# Patient Record
Sex: FEMALE | Race: WHITE | Employment: OTHER | URBAN - METROPOLITAN AREA
[De-identification: names, ages, dates, MRNs, and addresses within clinical notes are randomized per-mention and may not be internally consistent; named-entity substitution may affect disease eponyms.]

---

## 2017-08-01 ENCOUNTER — HOSPITAL ENCOUNTER (OUTPATIENT)
Dept: OTHER | Age: 82
Discharge: HOME | End: 2017-08-31
Attending: FAMILY MEDICINE | Admitting: FAMILY MEDICINE

## 2017-08-22 LAB
ALBUMIN SERPL-MCNC: 3.5 GM/DL (ref 3.4–5)
ALP BLD-CCNC: 97 IU/L (ref 40–128)
ALT SERPL-CCNC: 7 U/L (ref 10–40)
ANION GAP SERPL CALCULATED.3IONS-SCNC: 13 MMOL/L (ref 4–16)
AST SERPL-CCNC: 15 IU/L (ref 15–37)
BASOPHILS ABSOLUTE: 0.1 K/CU MM
BASOPHILS RELATIVE PERCENT: 0.8 % (ref 0–1)
BILIRUB SERPL-MCNC: 0.4 MG/DL (ref 0–1)
BUN BLDV-MCNC: 15 MG/DL (ref 6–23)
CALCIUM SERPL-MCNC: 9.2 MG/DL (ref 8.3–10.6)
CHLORIDE BLD-SCNC: 103 MMOL/L (ref 99–110)
CHOLESTEROL: 137 MG/DL
CO2: 24 MMOL/L (ref 21–32)
CREAT SERPL-MCNC: 0.9 MG/DL (ref 0.6–1.1)
DIFFERENTIAL TYPE: ABNORMAL
EOSINOPHILS ABSOLUTE: 0.3 K/CU MM
EOSINOPHILS RELATIVE PERCENT: 4.2 % (ref 0–3)
GFR AFRICAN AMERICAN: >60 ML/MIN/1.73M2
GFR NON-AFRICAN AMERICAN: 60 ML/MIN/1.73M2
GLUCOSE BLD-MCNC: 85 MG/DL (ref 70–140)
HCT VFR BLD CALC: 34 % (ref 37–47)
HDLC SERPL-MCNC: 56 MG/DL
HEMOGLOBIN: 10.9 GM/DL (ref 12.5–16)
IMMATURE NEUTROPHIL %: 0.3 % (ref 0–0.43)
LDL CHOLESTEROL DIRECT: 63 MG/DL
LYMPHOCYTES ABSOLUTE: 2.2 K/CU MM
LYMPHOCYTES RELATIVE PERCENT: 31.6 % (ref 24–44)
MCH RBC QN AUTO: 28.2 PG (ref 27–31)
MCHC RBC AUTO-ENTMCNC: 32.1 % (ref 32–36)
MCV RBC AUTO: 88.1 FL (ref 78–100)
MONOCYTES ABSOLUTE: 0.6 K/CU MM
MONOCYTES RELATIVE PERCENT: 8.4 % (ref 0–4)
NUCLEATED RBC %: 0 %
PDW BLD-RTO: 15.2 % (ref 11.7–14.9)
PLATELET # BLD: 271 K/CU MM (ref 140–440)
PMV BLD AUTO: 9.9 FL (ref 7.5–11.1)
POTASSIUM SERPL-SCNC: 4.2 MMOL/L (ref 3.5–5.1)
RBC # BLD: 3.86 M/CU MM (ref 4.2–5.4)
SEGMENTED NEUTROPHILS ABSOLUTE COUNT: 3.9 K/CU MM
SEGMENTED NEUTROPHILS RELATIVE PERCENT: 54.7 % (ref 36–66)
SODIUM BLD-SCNC: 140 MMOL/L (ref 135–145)
TOTAL IMMATURE NEUTOROPHIL: 0.02 K/CU MM
TOTAL NUCLEATED RBC: 0 K/CU MM
TOTAL PROTEIN: 6 GM/DL (ref 6.4–8.2)
TRIGL SERPL-MCNC: 113 MG/DL
TSH HIGH SENSITIVITY: 6.84 UIU/ML (ref 0.27–4.2)
WBC # BLD: 7.1 K/CU MM (ref 4–10.5)

## 2017-09-01 ENCOUNTER — HOSPITAL ENCOUNTER (OUTPATIENT)
Dept: OTHER | Age: 82
Discharge: HOME | End: 2017-09-30
Attending: FAMILY MEDICINE | Admitting: FAMILY MEDICINE

## 2017-10-01 ENCOUNTER — HOSPITAL ENCOUNTER (OUTPATIENT)
Dept: OTHER | Age: 82
Discharge: HOME | End: 2017-10-31
Attending: FAMILY MEDICINE | Admitting: FAMILY MEDICINE

## 2017-11-01 ENCOUNTER — HOSPITAL ENCOUNTER (OUTPATIENT)
Dept: OTHER | Age: 82
Discharge: HOME | End: 2017-11-30
Attending: FAMILY MEDICINE | Admitting: FAMILY MEDICINE

## 2017-11-16 PROBLEM — K81.0 ACUTE ACALCULOUS CHOLECYSTITIS: Status: ACTIVE | Noted: 2017-11-16

## 2017-11-18 PROBLEM — R74.8 ELEVATED LIVER ENZYMES: Status: ACTIVE | Noted: 2017-11-18

## 2019-02-01 ENCOUNTER — APPOINTMENT (OUTPATIENT)
Dept: GENERAL RADIOLOGY | Age: 84
End: 2019-02-01
Payer: MEDICARE

## 2019-02-01 ENCOUNTER — HOSPITAL ENCOUNTER (EMERGENCY)
Age: 84
Discharge: HOME | End: 2019-02-01
Attending: EMERGENCY MEDICINE
Payer: MEDICARE

## 2019-02-01 ENCOUNTER — APPOINTMENT (OUTPATIENT)
Dept: CT IMAGING | Age: 84
End: 2019-02-01
Payer: MEDICARE

## 2019-02-01 VITALS
HEART RATE: 52 BPM | HEIGHT: 64 IN | RESPIRATION RATE: 17 BRPM | TEMPERATURE: 97.9 F | SYSTOLIC BLOOD PRESSURE: 154 MMHG | OXYGEN SATURATION: 100 % | BODY MASS INDEX: 23.9 KG/M2 | WEIGHT: 140 LBS | DIASTOLIC BLOOD PRESSURE: 80 MMHG

## 2019-02-01 DIAGNOSIS — R53.83 FATIGUE, UNSPECIFIED TYPE: Primary | ICD-10-CM

## 2019-02-01 LAB
ALBUMIN SERPL-MCNC: 4.3 GM/DL (ref 3.4–5)
ALP BLD-CCNC: 61 IU/L (ref 40–129)
ALT SERPL-CCNC: 6 U/L (ref 10–40)
AMMONIA: 22 UMOL/L (ref 11–51)
ANION GAP SERPL CALCULATED.3IONS-SCNC: 14 MMOL/L (ref 4–16)
AST SERPL-CCNC: 13 IU/L (ref 15–37)
BACTERIA: NEGATIVE /HPF
BASOPHILS ABSOLUTE: 0.1 K/CU MM
BASOPHILS RELATIVE PERCENT: 0.9 % (ref 0–1)
BILIRUB SERPL-MCNC: 0.4 MG/DL (ref 0–1)
BILIRUBIN URINE: NEGATIVE MG/DL
BLOOD, URINE: NEGATIVE
BUN BLDV-MCNC: 19 MG/DL (ref 6–23)
CALCIUM SERPL-MCNC: 9.3 MG/DL (ref 8.3–10.6)
CHLORIDE BLD-SCNC: 105 MMOL/L (ref 99–110)
CLARITY: CLEAR
CO2: 21 MMOL/L (ref 21–32)
COLOR: ABNORMAL
CREAT SERPL-MCNC: 1.1 MG/DL (ref 0.6–1.1)
DIFFERENTIAL TYPE: ABNORMAL
EOSINOPHILS ABSOLUTE: 0.1 K/CU MM
EOSINOPHILS RELATIVE PERCENT: 1.4 % (ref 0–3)
GFR AFRICAN AMERICAN: 57 ML/MIN/1.73M2
GFR NON-AFRICAN AMERICAN: 47 ML/MIN/1.73M2
GLUCOSE BLD-MCNC: 92 MG/DL (ref 70–99)
GLUCOSE, URINE: NEGATIVE MG/DL
HCT VFR BLD CALC: 41.7 % (ref 37–47)
HEMOGLOBIN: 13.1 GM/DL (ref 12.5–16)
HYALINE CASTS: 2 /LPF
IMMATURE NEUTROPHIL %: 0.2 % (ref 0–0.43)
KETONES, URINE: NEGATIVE MG/DL
LACTATE: 1.1 MMOL/L (ref 0.4–2)
LEUKOCYTE ESTERASE, URINE: NEGATIVE
LYMPHOCYTES ABSOLUTE: 2.6 K/CU MM
LYMPHOCYTES RELATIVE PERCENT: 43.4 % (ref 24–44)
MCH RBC QN AUTO: 29.6 PG (ref 27–31)
MCHC RBC AUTO-ENTMCNC: 31.4 % (ref 32–36)
MCV RBC AUTO: 94.3 FL (ref 78–100)
MONOCYTES ABSOLUTE: 0.5 K/CU MM
MONOCYTES RELATIVE PERCENT: 8 % (ref 0–4)
NITRITE URINE, QUANTITATIVE: NEGATIVE
NUCLEATED RBC %: 0 %
PDW BLD-RTO: 14.2 % (ref 11.7–14.9)
PH, URINE: 7 (ref 5–8)
PLATELET # BLD: 251 K/CU MM (ref 140–440)
PMV BLD AUTO: 9.1 FL (ref 7.5–11.1)
POTASSIUM SERPL-SCNC: 4.2 MMOL/L (ref 3.5–5.1)
PROTEIN UA: NEGATIVE MG/DL
RBC # BLD: 4.42 M/CU MM (ref 4.2–5.4)
RBC URINE: <1 /HPF (ref 0–6)
SEGMENTED NEUTROPHILS ABSOLUTE COUNT: 2.7 K/CU MM
SEGMENTED NEUTROPHILS RELATIVE PERCENT: 46.1 % (ref 36–66)
SODIUM BLD-SCNC: 140 MMOL/L (ref 135–145)
SPECIFIC GRAVITY UA: 1.01 (ref 1–1.03)
SQUAMOUS EPITHELIAL: <1 /HPF
T4 FREE: 1.26 NG/DL (ref 0.9–1.8)
TOTAL IMMATURE NEUTOROPHIL: 0.01 K/CU MM
TOTAL NUCLEATED RBC: 0 K/CU MM
TOTAL PROTEIN: 7.4 GM/DL (ref 6.4–8.2)
TRICHOMONAS: ABNORMAL /HPF
TROPONIN T: <0.01 NG/ML
TSH HIGH SENSITIVITY: 11.79 UIU/ML (ref 0.27–4.2)
UROBILINOGEN, URINE: NORMAL MG/DL (ref 0.2–1)
WBC # BLD: 5.9 K/CU MM (ref 4–10.5)
WBC UA: <1 /HPF (ref 0–5)

## 2019-02-01 PROCEDURE — 71045 X-RAY EXAM CHEST 1 VIEW: CPT

## 2019-02-01 PROCEDURE — 83605 ASSAY OF LACTIC ACID: CPT

## 2019-02-01 PROCEDURE — 99285 EMERGENCY DEPT VISIT HI MDM: CPT

## 2019-02-01 PROCEDURE — 87040 BLOOD CULTURE FOR BACTERIA: CPT

## 2019-02-01 PROCEDURE — 84443 ASSAY THYROID STIM HORMONE: CPT

## 2019-02-01 PROCEDURE — 81001 URINALYSIS AUTO W/SCOPE: CPT

## 2019-02-01 PROCEDURE — 85025 COMPLETE CBC W/AUTO DIFF WBC: CPT

## 2019-02-01 PROCEDURE — 84439 ASSAY OF FREE THYROXINE: CPT

## 2019-02-01 PROCEDURE — 36415 COLL VENOUS BLD VENIPUNCTURE: CPT

## 2019-02-01 PROCEDURE — 82140 ASSAY OF AMMONIA: CPT

## 2019-02-01 PROCEDURE — 70450 CT HEAD/BRAIN W/O DYE: CPT

## 2019-02-01 PROCEDURE — 84484 ASSAY OF TROPONIN QUANT: CPT

## 2019-02-01 PROCEDURE — 93005 ELECTROCARDIOGRAM TRACING: CPT | Performed by: EMERGENCY MEDICINE

## 2019-02-01 PROCEDURE — 80053 COMPREHEN METABOLIC PANEL: CPT

## 2019-02-01 RX ORDER — LEVOTHYROXINE SODIUM 0.05 MG/1
50 TABLET ORAL DAILY
COMMUNITY
End: 2020-02-27 | Stop reason: ALTCHOICE

## 2019-02-01 RX ORDER — ACETAMINOPHEN 325 MG/1
650 TABLET ORAL EVERY 6 HOURS PRN
COMMUNITY
End: 2020-02-17

## 2019-02-01 RX ORDER — MELOXICAM 7.5 MG/1
7.5 TABLET ORAL DAILY
COMMUNITY
End: 2020-02-17

## 2019-02-01 RX ORDER — RANITIDINE 150 MG/1
150 TABLET ORAL 2 TIMES DAILY
COMMUNITY
End: 2020-02-17

## 2019-02-02 PROCEDURE — 93010 ELECTROCARDIOGRAM REPORT: CPT | Performed by: INTERNAL MEDICINE

## 2019-02-06 LAB
CULTURE: NORMAL
CULTURE: NORMAL
EKG ATRIAL RATE: 312 BPM
EKG DIAGNOSIS: NORMAL
EKG Q-T INTERVAL: 426 MS
EKG QRS DURATION: 80 MS
EKG QTC CALCULATION (BAZETT): 388 MS
EKG R AXIS: -43 DEGREES
EKG T AXIS: -5 DEGREES
EKG VENTRICULAR RATE: 50 BPM
Lab: NORMAL
Lab: NORMAL
REPORT STATUS: NORMAL
REPORT STATUS: NORMAL
SPECIMEN: NORMAL
SPECIMEN: NORMAL

## 2020-02-17 ENCOUNTER — APPOINTMENT (OUTPATIENT)
Dept: CT IMAGING | Age: 85
End: 2020-02-17
Payer: MEDICARE

## 2020-02-17 ENCOUNTER — APPOINTMENT (OUTPATIENT)
Dept: GENERAL RADIOLOGY | Age: 85
End: 2020-02-17
Payer: MEDICARE

## 2020-02-17 ENCOUNTER — HOSPITAL ENCOUNTER (EMERGENCY)
Age: 85
Discharge: HOME | End: 2020-02-17
Payer: MEDICARE

## 2020-02-17 VITALS
RESPIRATION RATE: 18 BRPM | BODY MASS INDEX: 26.46 KG/M2 | HEART RATE: 86 BPM | OXYGEN SATURATION: 98 % | HEIGHT: 64 IN | TEMPERATURE: 97.8 F | DIASTOLIC BLOOD PRESSURE: 82 MMHG | SYSTOLIC BLOOD PRESSURE: 167 MMHG | WEIGHT: 155 LBS

## 2020-02-17 PROCEDURE — 73501 X-RAY EXAM HIP UNI 1 VIEW: CPT

## 2020-02-17 PROCEDURE — 72125 CT NECK SPINE W/O DYE: CPT

## 2020-02-17 PROCEDURE — 93005 ELECTROCARDIOGRAM TRACING: CPT | Performed by: EMERGENCY MEDICINE

## 2020-02-17 PROCEDURE — 70486 CT MAXILLOFACIAL W/O DYE: CPT

## 2020-02-17 PROCEDURE — 2580000003 HC RX 258: Performed by: PHYSICIAN ASSISTANT

## 2020-02-17 PROCEDURE — 90471 IMMUNIZATION ADMIN: CPT | Performed by: PHYSICIAN ASSISTANT

## 2020-02-17 PROCEDURE — 96374 THER/PROPH/DIAG INJ IV PUSH: CPT

## 2020-02-17 PROCEDURE — 6360000002 HC RX W HCPCS: Performed by: PHYSICIAN ASSISTANT

## 2020-02-17 PROCEDURE — 99284 EMERGENCY DEPT VISIT MOD MDM: CPT

## 2020-02-17 PROCEDURE — 96361 HYDRATE IV INFUSION ADD-ON: CPT

## 2020-02-17 PROCEDURE — 70450 CT HEAD/BRAIN W/O DYE: CPT

## 2020-02-17 PROCEDURE — 90715 TDAP VACCINE 7 YRS/> IM: CPT | Performed by: PHYSICIAN ASSISTANT

## 2020-02-17 PROCEDURE — 96375 TX/PRO/DX INJ NEW DRUG ADDON: CPT

## 2020-02-17 RX ORDER — MORPHINE SULFATE 4 MG/ML
4 INJECTION, SOLUTION INTRAMUSCULAR; INTRAVENOUS EVERY 30 MIN PRN
Status: DISCONTINUED | OUTPATIENT
Start: 2020-02-17 | End: 2020-02-18 | Stop reason: HOSPADM

## 2020-02-17 RX ORDER — MAGNESIUM HYDROXIDE/ALUMINUM HYDROXICE/SIMETHICONE 120; 1200; 1200 MG/30ML; MG/30ML; MG/30ML
30 SUSPENSION ORAL EVERY 4 HOURS PRN
COMMUNITY

## 2020-02-17 RX ORDER — TRAMADOL HYDROCHLORIDE 50 MG/1
25 TABLET, FILM COATED ORAL NIGHTLY
Status: ON HOLD | COMMUNITY
End: 2020-08-27 | Stop reason: HOSPADM

## 2020-02-17 RX ORDER — TRAMADOL HYDROCHLORIDE 50 MG/1
50 TABLET ORAL EVERY 6 HOURS PRN
COMMUNITY
End: 2020-02-27

## 2020-02-17 RX ORDER — ACETAMINOPHEN 500 MG
500 TABLET ORAL EVERY 6 HOURS PRN
Qty: 30 TABLET | Refills: 0 | Status: SHIPPED | OUTPATIENT
Start: 2020-02-17

## 2020-02-17 RX ORDER — SERTRALINE HYDROCHLORIDE 25 MG/1
25 TABLET, FILM COATED ORAL NIGHTLY
COMMUNITY

## 2020-02-17 RX ORDER — 0.9 % SODIUM CHLORIDE 0.9 %
1000 INTRAVENOUS SOLUTION INTRAVENOUS ONCE
Status: COMPLETED | OUTPATIENT
Start: 2020-02-17 | End: 2020-02-17

## 2020-02-17 RX ORDER — ACETAMINOPHEN 325 MG/1
650 TABLET ORAL EVERY 6 HOURS PRN
COMMUNITY
End: 2020-02-27

## 2020-02-17 RX ORDER — ONDANSETRON 2 MG/ML
4 INJECTION INTRAMUSCULAR; INTRAVENOUS EVERY 30 MIN PRN
Status: DISCONTINUED | OUTPATIENT
Start: 2020-02-17 | End: 2020-02-18 | Stop reason: HOSPADM

## 2020-02-17 RX ORDER — NITROGLYCERIN 0.4 MG/1
0.4 TABLET SUBLINGUAL EVERY 5 MIN PRN
Status: ON HOLD | COMMUNITY
End: 2020-08-23

## 2020-02-17 RX ORDER — GABAPENTIN 100 MG/1
100 CAPSULE ORAL 3 TIMES DAILY
COMMUNITY

## 2020-02-17 RX ADMIN — ONDANSETRON 4 MG: 2 INJECTION INTRAMUSCULAR; INTRAVENOUS at 16:55

## 2020-02-17 RX ADMIN — MORPHINE SULFATE 4 MG: 4 INJECTION, SOLUTION INTRAMUSCULAR; INTRAVENOUS at 16:56

## 2020-02-17 RX ADMIN — TETANUS TOXOID, REDUCED DIPHTHERIA TOXOID AND ACELLULAR PERTUSSIS VACCINE, ADSORBED 0.5 ML: 5; 2.5; 8; 8; 2.5 SUSPENSION INTRAMUSCULAR at 16:58

## 2020-02-17 RX ADMIN — SODIUM CHLORIDE 1000 ML: 9 INJECTION, SOLUTION INTRAVENOUS at 16:54

## 2020-02-17 ASSESSMENT — PAIN SCALES - GENERAL
PAINLEVEL_OUTOF10: 10
PAINLEVEL_OUTOF10: 9

## 2020-02-17 NOTE — ED NOTES
Pt back from radiology       [address] [place] [address] Dr. Cullen Estrada, [facility]0 [facility]  02/17/20 4431

## 2020-02-17 NOTE — PROGRESS NOTES
Medication History  [facility]    Patient Name: Austin Garrison 11/28/1932     Medication history has been completed by: Bill Vicente CPhT    Source(s) of information: [facility]    Primary Care Physician: Yuni Tripp DO     Pharmacy: [facility]    Allergies as of 02/17/2020 - Review Complete 02/17/2020   Allergen Reaction Noted    Pcn [penicillins] Other (See Comments) 11/16/2017        Prior to Admission medications    Medication Sig Start Date End Date Taking? Authorizing Provider   sertraline (ZOLOFT) 25 MG tablet Take 25 mg by mouth nightly   Yes Historical Provider, MD   gabapentin (NEURONTIN) 100 MG capsule Take 100 mg by mouth 3 times daily. Yes Historical Provider, MD   acetaminophen (APAP EXTRA STRENGTH) 500 MG tablet Take 1 tablet by mouth every 6 hours as needed for Pain 2/17/20  Yes CHARITY Casas   traMADol (ULTRAM) 25 MG split-tablet Take 25 mg by mouth nightly. Yes Historical Provider, MD   levothyroxine (SYNTHROID) 50 MCG tablet Take 50 mcg by mouth Daily    Yes Historical Provider, MD   polyethylene glycol (GLYCOLAX) packet Take 17 g by mouth daily    Yes Historical Provider, MD   Cholecalciferol (VITAMIN D3) 54084 units CAPS Take 1 capsule by mouth every 30 days    Yes Historical Provider, MD   apixaban (ELIQUIS) 5 MG TABS tablet Take 5 mg by mouth 2 times daily    Yes Historical Provider, MD   traMADol (ULTRAM) 50 MG tablet Take 50 mg by mouth every 6 hours as needed for Pain. Historical Provider, MD   nitroGLYCERIN (NITROSTAT) 0.4 MG SL tablet Place 0.4 mg under the tongue every 5 minutes as needed for Chest pain up to max of 3 total doses. If no relief after 1 dose, call 911.     Historical Provider, MD   aluminum & magnesium hydroxide-simethicone (MAALOX) 200-200-20 MG/5ML SUSP suspension Take 30 mLs by mouth every 4 hours as needed for Indigestion    Historical Provider, MD   acetaminophen (TYLENOL) 325 MG tablet Take 650 mg by mouth every 6 hours as needed for Pain    Historical Provider, MD   Menthol, Topical Analgesic, (BIOFREEZE EX) Apply topically 2 times daily     Historical Provider, MD       Medications added or changed (ex. new medication, dosage change, interval change, formulation change):  Levothyroxine dose clarified as 50 mcg qd as multiple doses on file  Zoloft new medication  Tramadol new scheduled and prn medication  Gabapentin new medication  Nitroglycerin new medication  Maalox new medication  Biofreeze frequency clarified as bid as no sig entered  Eliquis frequency clarified as bid as no sig entered  Glycolax frequency changed to qd scheduled from qd prn  Vitamin D dose clarified as 61508 units monthly as no sig entered    Medications removed from list (include reason, ex. noncompliance, medication cost, therapy complete etc.):   Aspirin no longer taking  Atorvastatin no longer taking  Norco no longer taking  Levothyroxine duplicate therapy   Mobic no longer taking  Omeprazole no longer taking  Zofran no longer taking  Ranitidine no longer taking  Vitamin E no longer taking  Tylenol removed in error    Comments:  Reviewed and updated med list per list sent with patient from facility  Patient stuttering and having trouble finding the words to say during assessment. Alerted RN and ER Pharmacist however after speaking with RN at [address] [place] [address],[address] found that this is typical for this patient so updated all parties.    RN at facility also states patient was at [facility] today for Afib which was diagnosed about 2 weeks ago    To my knowledge the above medication history is accurate as of 2/17/2020 6:09 PM.   Roberto Lewis CPhT   2/17/2020 6:09 PM

## 2020-02-17 NOTE — ED PROVIDER NOTES
eMERGENCY dEPARTMENT eNCOUnter      PCP: Teja Singh DO    CHIEF COMPLAINT    Chief Complaint   Patient presents with    Fall     Lost Balance and Bret Sergio, Right Hip/Leg Pain, Neck Pain, Head Laceration       HPI    Dianne Rosario is a 80 y.o. female who presents with fall. Onset was prior to arrival.  Patient reports that she was trying to get out the car in a parking lot when she fell to the side, hitting the right side of her head and right hip. No reported loss of consciousness. She does have laceration to right side of forehead with some swelling. She has not been ambulatory following the accident. She complains of head, neck and bilateral hip pain. Denies decreased sensation or weakness of extremities. She does have history of CVA with dysphasia. The fall was mechanical in nature without preceding symptoms. REVIEW OF SYSTEMS    General: No fever  ENT:  No visual changes. + headache. Cardiac: No Chest Pain, denies syncope  Respiratory: No cough or difficulty breathing  GI: No vomiting. No Bloody Stool or Diarrhea  : No Dysuria or Hematuria  MSKTL:  See HPI.  + neck, no back pain. Neurologic: denies LOC, + headache, no dizziness, confusion.   No hearing loss    See HPI and nursing notes for additional information     PAST MEDICAL & SURGICAL HISTORY    Past Medical History:   Diagnosis Date    Atrial fibrillation ([address]     Cerebral artery occlusion with cerebral infarction ([address]     CVD (cardiovascular disease)     Dysphagia     GERD (gastroesophageal reflux disease)     Hypertension     Muscle weakness     Thyroid disease     hypo     Past Surgical History:   Procedure Laterality Date    APPENDECTOMY      BACK SURGERY      HIP SURGERY Right     repair    HYSTERECTOMY         CURRENT MEDICATIONS    Current Outpatient Rx   Medication Sig Dispense Refill    sertraline (ZOLOFT) 25 MG tablet Take 25 mg by mouth nightly      gabapentin (NEURONTIN) 100 MG capsule Take 100 mg by mouth 3 times daily.  acetaminophen (APAP EXTRA STRENGTH) 500 MG tablet Take 1 tablet by mouth every 6 hours as needed for Pain 30 tablet 0    traMADol (ULTRAM) 25 MG split-tablet Take 25 mg by mouth nightly.  levothyroxine (SYNTHROID) 50 MCG tablet Take 50 mcg by mouth Daily       polyethylene glycol (GLYCOLAX) packet Take 17 g by mouth daily       Cholecalciferol (VITAMIN D3) 60518 units CAPS Take 1 capsule by mouth every 30 days       apixaban (ELIQUIS) 5 MG TABS tablet Take 5 mg by mouth 2 times daily       traMADol (ULTRAM) 50 MG tablet Take 50 mg by mouth every 6 hours as needed for Pain.  nitroGLYCERIN (NITROSTAT) 0.4 MG SL tablet Place 0.4 mg under the tongue every 5 minutes as needed for Chest pain up to max of 3 total doses. If no relief after 1 dose, call 911.       aluminum & magnesium hydroxide-simethicone (MAALOX) 200-200-20 MG/5ML SUSP suspension Take 30 mLs by mouth every 4 hours as needed for Indigestion      acetaminophen (TYLENOL) 325 MG tablet Take 650 mg by mouth every 6 hours as needed for Pain      Menthol, Topical Analgesic, (BIOFREEZE EX) Apply topically 2 times daily          ALLERGIES    Allergies   Allergen Reactions    Pcn [Penicillins] Other (See Comments)       SOCIAL & FAMILY HISTORY    Social History     Socioeconomic History    Marital status:      Spouse name: None    Number of children: None    Years of education: None    Highest education level: None   Occupational History    None   Social Needs    Financial resource strain: None    Food insecurity:     Worry: None     Inability: None    Transportation needs:     Medical: None     Non-medical: None   Tobacco Use    Smoking status: Never Smoker    Smokeless tobacco: Never Used   Substance and Sexual Activity    Alcohol use: No    Drug use: No    Sexual activity: None   Lifestyle    Physical activity:     Days per week: None     Minutes per session: None    Stress: None

## 2020-02-17 NOTE — ED TRIAGE NOTES
Pt presents to the ED by squad for a fall with resulting right hip/leg pain, laceration to the right side of head with no LOC, and neck pain. Pt placed on C-Collar by EMS. Pt takes Eliquis.

## 2020-02-17 NOTE — ED NOTES
Bed: H-03  Expected date:   Expected time:   Means of arrival:   Comments:  NACHO Sepulveda  02/17/20 1529

## 2020-02-18 ENCOUNTER — HOSPITAL ENCOUNTER (EMERGENCY)
Age: 85
Discharge: HOME | End: 2020-02-19
Payer: MEDICARE

## 2020-02-18 ENCOUNTER — APPOINTMENT (OUTPATIENT)
Dept: GENERAL RADIOLOGY | Age: 85
End: 2020-02-18
Payer: MEDICARE

## 2020-02-18 ENCOUNTER — APPOINTMENT (OUTPATIENT)
Dept: CT IMAGING | Age: 85
End: 2020-02-18
Payer: MEDICARE

## 2020-02-18 ENCOUNTER — APPOINTMENT (OUTPATIENT)
Dept: ULTRASOUND IMAGING | Age: 85
End: 2020-02-18
Payer: MEDICARE

## 2020-02-18 PROCEDURE — 73700 CT LOWER EXTREMITY W/O DYE: CPT

## 2020-02-18 PROCEDURE — 93010 ELECTROCARDIOGRAM REPORT: CPT | Performed by: INTERNAL MEDICINE

## 2020-02-18 PROCEDURE — 99284 EMERGENCY DEPT VISIT MOD MDM: CPT

## 2020-02-18 PROCEDURE — 73560 X-RAY EXAM OF KNEE 1 OR 2: CPT

## 2020-02-18 PROCEDURE — 73590 X-RAY EXAM OF LOWER LEG: CPT

## 2020-02-18 PROCEDURE — 93971 EXTREMITY STUDY: CPT

## 2020-02-18 PROCEDURE — 6370000000 HC RX 637 (ALT 250 FOR IP): Performed by: PHYSICIAN ASSISTANT

## 2020-02-18 RX ORDER — TRAMADOL HYDROCHLORIDE 50 MG/1
50 TABLET ORAL ONCE
Status: COMPLETED | OUTPATIENT
Start: 2020-02-18 | End: 2020-02-18

## 2020-02-18 RX ORDER — ACETAMINOPHEN 325 MG/1
650 TABLET ORAL ONCE
Status: COMPLETED | OUTPATIENT
Start: 2020-02-18 | End: 2020-02-18

## 2020-02-18 RX ORDER — TRAMADOL HYDROCHLORIDE 50 MG/1
50 TABLET ORAL EVERY 6 HOURS PRN
Qty: 10 TABLET | Refills: 0 | Status: SHIPPED | OUTPATIENT
Start: 2020-02-18 | End: 2020-02-21

## 2020-02-18 RX ADMIN — ACETAMINOPHEN 650 MG: 325 TABLET ORAL at 18:26

## 2020-02-18 RX ADMIN — TRAMADOL HYDROCHLORIDE 50 MG: 50 TABLET, FILM COATED ORAL at 20:53

## 2020-02-18 ASSESSMENT — PAIN SCALES - GENERAL
PAINLEVEL_OUTOF10: 4
PAINLEVEL_OUTOF10: 7
PAINLEVEL_OUTOF10: 6
PAINLEVEL_OUTOF10: 3
PAINLEVEL_OUTOF10: 5

## 2020-02-18 ASSESSMENT — PAIN DESCRIPTION - LOCATION: LOCATION: FOOT

## 2020-02-18 ASSESSMENT — PAIN DESCRIPTION - ORIENTATION: ORIENTATION: RIGHT;LEFT

## 2020-02-18 ASSESSMENT — PAIN DESCRIPTION - PAIN TYPE: TYPE: ACUTE PAIN

## 2020-02-18 NOTE — ED NOTES
Called and talked with Marquis Maya ZAPATA at [facility] area; updated [place] LPN that the pt will be leaving here around 2200 with med trans       Maggie White, [facility]  02/17/20 1906

## 2020-02-18 NOTE — CARE COORDINATION
CM consulted to room #15 by Leopold Leek to assist with discharge planning. Patient resident at [facility] assisted living. Emily Rowe concerned with availability of extra assistance for patient when necessary at assisted living. Emily Rowe states patient is having shuffling gate and difficulty ambulating without assistance. CM contacted [facility]. [facility] staff state all patient needs to do is press alert button when she needs to ambulate and assistance will be sent to patient's room.

## 2020-02-18 NOTE — ED PROVIDER NOTES
Triage Chief Complaint:   Foot Pain (post fall 2/17 pain to both legs more r leg)    [place]:  Today in the ED I had the pleasure of caring for Paloma Eller who is a 80 y.o. female that presents today to the emergency department complaining of right sided leg pain. Context this patient fell out of the car while trying to get out of the car yesterday. Was seen here in the ED. Had a CT scan of her face head neck which were negative. Also had x-rays of bilateral hips which were negative. Discharged home. Back to the nursing home. Patient states that she can barely walk secondary to pain the pain is mainly in her right leg she points to the knee and anterior mid tibial/fibular region. She denies any foot pain. She denies any headache. Denies paresthesias    ROS:  REVIEW OF SYSTEMS    At least 10 systems reviewed      All other review of systems are negative  See HPI and nursing notes for additional information       Past Medical History:   Diagnosis Date    Atrial fibrillation ([address]     Cerebral artery occlusion with cerebral infarction ([address]     CVD (cardiovascular disease)     Dysphagia     GERD (gastroesophageal reflux disease)     Hypertension     Muscle weakness     Thyroid disease     hypo     Past Surgical History:   Procedure Laterality Date    APPENDECTOMY      BACK SURGERY      HIP SURGERY Right     repair    HYSTERECTOMY       History reviewed. No pertinent family history.   Social History     Socioeconomic History    Marital status:      Spouse name: Not on file    Number of children: Not on file    Years of education: Not on file    Highest education level: Not on file   Occupational History    Not on file   Social Needs    Financial resource strain: Not on file    Food insecurity:     Worry: Not on file     Inability: Not on file    Transportation needs:     Medical: Not on file     Non-medical: Not on file   Tobacco Use    Smoking status: Never Smoker    Smokeless tobacco: Never Used   Substance and Sexual Activity    Alcohol use: No    Drug use: No    Sexual activity: Not on file   Lifestyle    Physical activity:     Days per week: Not on file     Minutes per session: Not on file    Stress: Not on file   Relationships    Social connections:     Talks on phone: Not on file     Gets together: Not on file     Attends Yarsani service: Not on file     Active member of club or organization: Not on file     Attends meetings of clubs or organizations: Not on file     Relationship status: Not on file    Intimate partner violence:     Fear of current or ex partner: Not on file     Emotionally abused: Not on file     Physically abused: Not on file     Forced sexual activity: Not on file   Other Topics Concern    Not on file   Social History Narrative    Not on file     No current facility-administered medications for this encounter. Current Outpatient Medications   Medication Sig Dispense Refill    sertraline (ZOLOFT) 25 MG tablet Take 25 mg by mouth nightly      traMADol (ULTRAM) 50 MG tablet Take 50 mg by mouth every 6 hours as needed for Pain.  gabapentin (NEURONTIN) 100 MG capsule Take 100 mg by mouth 3 times daily.  nitroGLYCERIN (NITROSTAT) 0.4 MG SL tablet Place 0.4 mg under the tongue every 5 minutes as needed for Chest pain up to max of 3 total doses. If no relief after 1 dose, call 911.  acetaminophen (APAP EXTRA STRENGTH) 500 MG tablet Take 1 tablet by mouth every 6 hours as needed for Pain 30 tablet 0    traMADol (ULTRAM) 25 MG split-tablet Take 25 mg by mouth nightly.       aluminum & magnesium hydroxide-simethicone (MAALOX) 200-200-20 MG/5ML SUSP suspension Take 30 mLs by mouth every 4 hours as needed for Indigestion      acetaminophen (TYLENOL) 325 MG tablet Take 650 mg by mouth every 6 hours as needed for Pain      Menthol, Topical Analgesic, (BIOFREEZE EX) Apply topically 2 times daily       levothyroxine (SYNTHROID) 50 MCG tablet spine. Xr Hip 1 Vw W Pelvis Right    Result Date: 2/17/2020  EXAMINATION: ONE XRAY VIEW OF THE PELVIS AND TWO XRAY VIEWS RIGHT HIP 2/17/2020 4:29 pm COMPARISON: None. HISTORY: ORDERING SYSTEM PROVIDED HISTORY: trauma TECHNOLOGIST PROVIDED HISTORY: Reason for exam:->trauma Reason for Exam: trauma Initial encounter. FINDINGS: Trauma board artifact degrades examination. Remote ORIF of a right femoral neck fracture. Pelvic ring is grossly intact. No acute displaced fracture within limits of this exam.  Prior ORIF of a right femoral neck fracture. MDM:   Neurovascularly intact patient presents today with knee pain. X-ray of the knee shows equivocal results so CT scan was ordered which reveals a possible occult fracture. There is hemarthrosis noted and severe to degenerative changes. With remaining possibility patient does have an occult fracture patient is provided a knee immobilizer analgesia and will require follow-up with orthopedics    I estimate there is LOW risk for (including but not limited to) OPEN FRACTURE, COMPARTMENT SYNDROME, DEEP VENOUS THROMBOSIS, COMPLETETENDON RUPTURE, NECROTIZING FASCIITIS, or ACUTE ARTERIAL INJURY, thus I consider the discharge disposition reasonable. Mare Bret (or their surrogate) and I have discussed the diagnosis and risks, and we agree with discharging home with close follow-up. We also discussed returning to the Emergency Department immediately if new or worsening symptoms occur. We have discussed the symptoms which are most concerning that necessitate immediate return. I independently managed patient today in the ED        BP (!) 138/114   Pulse 65   Temp 97.5 °F (36.4 °C) (Oral)   Resp 16   Ht 5' 4\" (1.626 m)   Wt 155 lb (70.3 kg)   SpO2 97%   BMI 26.61 kg/m²       Clinical Impression:  1. Effusion of right knee    2.  Abnormal CT scan        Disposition referral (if applicable):  Yessi Young MD  [address]

## 2020-02-18 NOTE — ED TRIAGE NOTES
Pt from nursing to ed with ems, c/o pain to ble post fall on 2/17. Pt noticed to have abrasion to r with old drainage. , bruising with slight swelling to l hand. Denies any other ss. VS stable. AOx4.

## 2020-02-19 ENCOUNTER — TELEPHONE (OUTPATIENT)
Dept: CARDIOLOGY CLINIC | Age: 85
End: 2020-02-19

## 2020-02-19 VITALS
BODY MASS INDEX: 26.46 KG/M2 | HEART RATE: 62 BPM | SYSTOLIC BLOOD PRESSURE: 159 MMHG | TEMPERATURE: 97.5 F | HEIGHT: 64 IN | DIASTOLIC BLOOD PRESSURE: 96 MMHG | RESPIRATION RATE: 15 BRPM | WEIGHT: 155 LBS | OXYGEN SATURATION: 99 %

## 2020-02-19 NOTE — ED NOTES
Report received from [facility], care assumed at this time     Demian Stokes, RN  02/18/20 2418

## 2020-02-19 NOTE — TELEPHONE ENCOUNTER
Attempting to reach  in order to schedule a consult for chest pain per referral from [facility]; no answer, no voicemail.

## 2020-02-19 NOTE — ED NOTES
Narrative   EXAMINATION:   TWO XRAY VIEWS OF THE RIGHT KNEE;   XRAY VIEWS OF THE RIGHT TIBIA AND FIBULA       2/18/2020 6:29 pm       COMPARISON:   None.       HISTORY:   ORDERING SYSTEM PROVIDED HISTORY: pain   TECHNOLOGIST PROVIDED HISTORY:   Reason for exam:->pain   Reason for Exam: rt. knee pain   Acuity: Acute   Type of Exam: Initial   Mechanism of Injury: fall   Relevant Medical/Surgical History: na; ORDERING SYSTEM PROVIDED HISTORY: pain   TECHNOLOGIST PROVIDED HISTORY:   Reason for exam:->pain   Reason for Exam: rt. lower leg pain   Acuity: Acute   Type of Exam: Initial   Mechanism of Injury: fall   Relevant Medical/Surgical History: na       FINDINGS:   Images of the knee, tibia, and fibula are reviewed. Mary Tijerina.  Moderate to   severe tricompartmental osteoarthritis of the knee.  Slight cortical   irregularity along the medial femoral condyle, suspicious for an   osteochondral defect.  A moderate knee joint lipohemarthrosis is present.  No   displaced fracture of the tibia or fibula is seen.           Impression   A knee joint lipohemarthrosis is present, which is concerning for an occult   intra-articular fracture such as a tibial plateau fracture.  This could be   further evaluated with a noncontrast CT of the knee.       Suspected osteochondral defect along the medial femoral condyle.         Bao Rodriguez RN  02/18/20 1931

## 2020-02-19 NOTE — ED NOTES
Pt continues to sleep in a position of comfort. No needs identified at this time. Bed in lowest position and call light within reach. Respirations even, no distress noted. Lights turned out for comfort.       Mark Chow RN  02/18/20 2112

## 2020-02-19 NOTE — ED NOTES
Narrative   EXAMINATION:   DUPLEX VENOUS ULTRASOUND OF THE RIGHT LOWER EXTREMITY, 2/18/2020 6:51 pm       TECHNIQUE:   Duplex ultrasound and Doppler images were obtained of the right lower   extremity.       COMPARISON:   None.       HISTORY:   ORDERING SYSTEM PROVIDED HISTORY: pain   TECHNOLOGIST PROVIDED HISTORY:   Reason for exam:->pain   Reason for Exam: pain   Acuity: Acute   Type of Exam: Initial       FINDINGS:   The visualized veins of the right lower extremity are patent and free of   echogenic thrombus. The veins are normally compressible and have normal   phasic flow.           Impression   No evidence of DVT in the right lower extremity.         Ngozi Evans RN  02/18/20 1932

## 2020-02-19 NOTE — ED NOTES
Pt resting in a position of comfort. No needs identified at this time. Bed in lowest position and call light within reach. Respirations even, no distress noted.       Sunday Vides RN  02/18/20 2013

## 2020-02-25 LAB
EKG DIAGNOSIS: NORMAL
EKG Q-T INTERVAL: 410 MS
EKG QRS DURATION: 86 MS
EKG QTC CALCULATION (BAZETT): 512 MS
EKG R AXIS: -39 DEGREES
EKG T AXIS: 10 DEGREES
EKG VENTRICULAR RATE: 94 BPM

## 2020-02-27 ENCOUNTER — OFFICE VISIT (OUTPATIENT)
Dept: ORTHOPEDIC SURGERY | Age: 85
End: 2020-02-27
Payer: MEDICARE

## 2020-02-27 VITALS — BODY MASS INDEX: 25.61 KG/M2 | HEIGHT: 64 IN | WEIGHT: 150 LBS | RESPIRATION RATE: 16 BRPM

## 2020-02-27 PROCEDURE — G8484 FLU IMMUNIZE NO ADMIN: HCPCS | Performed by: ORTHOPAEDIC SURGERY

## 2020-02-27 PROCEDURE — 1090F PRES/ABSN URINE INCON ASSESS: CPT | Performed by: ORTHOPAEDIC SURGERY

## 2020-02-27 PROCEDURE — 1123F ACP DISCUSS/DSCN MKR DOCD: CPT | Performed by: ORTHOPAEDIC SURGERY

## 2020-02-27 PROCEDURE — 99203 OFFICE O/P NEW LOW 30 MIN: CPT | Performed by: ORTHOPAEDIC SURGERY

## 2020-02-27 PROCEDURE — 4040F PNEUMOC VAC/ADMIN/RCVD: CPT | Performed by: ORTHOPAEDIC SURGERY

## 2020-02-27 PROCEDURE — G8427 DOCREV CUR MEDS BY ELIG CLIN: HCPCS | Performed by: ORTHOPAEDIC SURGERY

## 2020-02-27 PROCEDURE — 1036F TOBACCO NON-USER: CPT | Performed by: ORTHOPAEDIC SURGERY

## 2020-02-27 PROCEDURE — G8417 CALC BMI ABV UP PARAM F/U: HCPCS | Performed by: ORTHOPAEDIC SURGERY

## 2020-02-27 RX ORDER — LEVOTHYROXINE SODIUM 0.05 MG/1
100 TABLET ORAL DAILY
Status: ON HOLD | COMMUNITY
Start: 2020-01-16 | End: 2020-08-27 | Stop reason: HOSPADM

## 2020-02-27 RX ORDER — HYDROCODONE BITARTRATE AND ACETAMINOPHEN 5; 325 MG/1; MG/1
TABLET ORAL
Status: ON HOLD | COMMUNITY
Start: 2020-02-20 | End: 2020-08-27 | Stop reason: HOSPADM

## 2020-02-27 ASSESSMENT — ENCOUNTER SYMPTOMS
EYE PAIN: 0
COLOR CHANGE: 0
VOMITING: 0
SHORTNESS OF BREATH: 0
EYE REDNESS: 0
CHEST TIGHTNESS: 0
WHEEZING: 0

## 2020-02-27 NOTE — PROGRESS NOTES
occlusion with cerebral infarction ([facility]     CVD (cardiovascular disease)     Dysphagia     GERD (gastroesophageal reflux disease)     Hypertension     Muscle weakness     Thyroid disease     hypo     No family history on file. Social History     Socioeconomic History    Marital status:      Spouse name: None    Number of children: None    Years of education: None    Highest education level: None   Occupational History    None   Social Needs    Financial resource strain: None    Food insecurity:     Worry: None     Inability: None    Transportation needs:     Medical: None     Non-medical: None   Tobacco Use    Smoking status: Never Smoker    Smokeless tobacco: Never Used   Substance and Sexual Activity    Alcohol use: No    Drug use: No    Sexual activity: None   Lifestyle    Physical activity:     Days per week: None     Minutes per session: None    Stress: None   Relationships    Social connections:     Talks on phone: None     Gets together: None     Attends Latter day service: None     Active member of club or organization: None     Attends meetings of clubs or organizations: None     Relationship status: None    Intimate partner violence:     Fear of current or ex partner: None     Emotionally abused: None     Physically abused: None     Forced sexual activity: None   Other Topics Concern    None   Social History Narrative    None     Current Outpatient Medications   Medication Sig Dispense Refill    HYDROcodone-acetaminophen (NORCO) 5-325 MG per tablet       levothyroxine (SYNTHROID) 50 MCG tablet 50 mcg       sertraline (ZOLOFT) 25 MG tablet Take 25 mg by mouth nightly      gabapentin (NEURONTIN) 100 MG capsule Take 100 mg by mouth 3 times daily.  nitroGLYCERIN (NITROSTAT) 0.4 MG SL tablet Place 0.4 mg under the tongue every 5 minutes as needed for Chest pain up to max of 3 total doses. If no relief after 1 dose, call 911.       acetaminophen (APAP EXTRA STRENGTH) 500 MG tablet Take 1 tablet by mouth every 6 hours as needed for Pain 30 tablet 0    aluminum & magnesium hydroxide-simethicone (MAALOX) 200-200-20 MG/5ML SUSP suspension Take 30 mLs by mouth every 4 hours as needed for Indigestion      Menthol, Topical Analgesic, (BIOFREEZE EX) Apply topically 2 times daily       polyethylene glycol (GLYCOLAX) packet Take 17 g by mouth daily       Cholecalciferol (VITAMIN D3) 65732 units CAPS Take 1 capsule by mouth every 30 days       apixaban (ELIQUIS) 5 MG TABS tablet Take 5 mg by mouth 2 times daily       traMADol (ULTRAM) 25 MG split-tablet Take 25 mg by mouth nightly. No current facility-administered medications for this visit. Allergies   Allergen Reactions    Pcn [Penicillins] Other (See Comments)       Review of Systems:   Review of Systems   Constitutional: Negative for chills and fever. HENT: Negative for congestion and sneezing. Eyes: Negative for pain and redness. Respiratory: Negative for chest tightness, shortness of breath and wheezing. Cardiovascular: Negative for chest pain and palpitations. Gastrointestinal: Negative for vomiting. Musculoskeletal: Positive for arthralgias and gait problem. Skin: Negative for color change and rash. Psychiatric/Behavioral: Negative for agitation. The patient is not nervous/anxious. Examination:  General Exam:  Vitals: Resp 16   Ht 5' 4\" (1.626 m)   Wt 150 lb (68 kg)   BMI 25.75 kg/m²    Physical Exam  Vitals signs and nursing note reviewed. Constitutional:       Appearance: Normal appearance. HENT:      Head: Normocephalic and atraumatic. Eyes:      Conjunctiva/sclera: Conjunctivae normal.      Pupils: Pupils are equal, round, and reactive to light. Neck:      Musculoskeletal: Normal range of motion.    Pulmonary:      Effort: Pulmonary effort is normal.   Musculoskeletal:      Right hip: She exhibits normal range of motion, normal strength, no

## 2020-02-27 NOTE — PROGRESS NOTES
Patient here for a new patient ED f/u for right knee pain due to a fall on 2/17/2020. [facility]. She was provided with a knee immobilizer and is take norco per med list brought in today with patient per SNF. She is a poor historian and is somewhat tearful and does not want her leg touched. She states her pain level is \"100/10\". ED XR RIGHT KNEE 2/18/2020  Impression   A knee joint lipohemarthrosis is present, which is concerning for an occult   intra-articular fracture such as a tibial plateau fracture.  This could be   further evaluated with a noncontrast CT of the knee.       Suspected osteochondral defect along the medial femoral condyle.               CT RIGHT KNEE 2/18/2020     Impression   1. Large hemarthrosis.  No definite fracture identified, however, the bones   are severely osteopenic and evaluation for nondisplaced fracture is limited. Hemarthrosis may be related to radiographically occult fracture versus   internal derangement of the knee.  MRI of the right knee could be obtained   for further evaluation. 2. Moderate to severe tricompartmental osteoarthritis. 3. Chondrocalcinosis.

## 2020-08-02 ENCOUNTER — APPOINTMENT (OUTPATIENT)
Dept: GENERAL RADIOLOGY | Age: 85
DRG: 083 | End: 2020-08-02
Payer: MEDICARE

## 2020-08-02 ENCOUNTER — HOSPITAL ENCOUNTER (INPATIENT)
Age: 85
LOS: 2 days | Discharge: ANOTHER HOSPITAL | DRG: 083 | End: 2020-08-04
Attending: EMERGENCY MEDICINE | Admitting: STUDENT IN AN ORGANIZED HEALTH CARE EDUCATION/TRAINING PROGRAM
Payer: MEDICARE

## 2020-08-02 ENCOUNTER — APPOINTMENT (OUTPATIENT)
Dept: CT IMAGING | Age: 85
DRG: 083 | End: 2020-08-02
Payer: MEDICARE

## 2020-08-02 PROBLEM — F07.81 CONCUSSION SYNDROME: Status: ACTIVE | Noted: 2020-08-02

## 2020-08-02 LAB
ALBUMIN SERPL-MCNC: 4 GM/DL (ref 3.4–5)
ALP BLD-CCNC: 65 IU/L (ref 40–129)
ALT SERPL-CCNC: 6 U/L (ref 10–40)
ANION GAP SERPL CALCULATED.3IONS-SCNC: 12 MMOL/L (ref 4–16)
AST SERPL-CCNC: 13 IU/L (ref 15–37)
BASOPHILS ABSOLUTE: 0 K/CU MM
BASOPHILS RELATIVE PERCENT: 0.2 % (ref 0–1)
BILIRUB SERPL-MCNC: 0.4 MG/DL (ref 0–1)
BUN BLDV-MCNC: 20 MG/DL (ref 6–23)
CALCIUM SERPL-MCNC: 9.4 MG/DL (ref 8.3–10.6)
CHLORIDE BLD-SCNC: 102 MMOL/L (ref 99–110)
CO2: 22 MMOL/L (ref 21–32)
CREAT SERPL-MCNC: 1 MG/DL (ref 0.6–1.1)
DIFFERENTIAL TYPE: ABNORMAL
EOSINOPHILS ABSOLUTE: 0 K/CU MM
EOSINOPHILS RELATIVE PERCENT: 0.1 % (ref 0–3)
GFR AFRICAN AMERICAN: >60 ML/MIN/1.73M2
GFR NON-AFRICAN AMERICAN: 52 ML/MIN/1.73M2
GLUCOSE BLD-MCNC: 124 MG/DL
GLUCOSE BLD-MCNC: 124 MG/DL (ref 70–99)
GLUCOSE BLD-MCNC: 141 MG/DL (ref 70–99)
HCT VFR BLD CALC: 41.3 % (ref 37–47)
HEMOGLOBIN: 13.1 GM/DL (ref 12.5–16)
IMMATURE NEUTROPHIL %: 0.5 % (ref 0–0.43)
INR BLD: 1.29 INDEX
LYMPHOCYTES ABSOLUTE: 0.7 K/CU MM
LYMPHOCYTES RELATIVE PERCENT: 5.6 % (ref 24–44)
MCH RBC QN AUTO: 28.2 PG (ref 27–31)
MCHC RBC AUTO-ENTMCNC: 31.7 % (ref 32–36)
MCV RBC AUTO: 88.8 FL (ref 78–100)
MONOCYTES ABSOLUTE: 0.7 K/CU MM
MONOCYTES RELATIVE PERCENT: 5 % (ref 0–4)
NUCLEATED RBC %: 0 %
PDW BLD-RTO: 16.3 % (ref 11.7–14.9)
PLATELET # BLD: 211 K/CU MM (ref 140–440)
PMV BLD AUTO: 9.7 FL (ref 7.5–11.1)
POTASSIUM SERPL-SCNC: 3.9 MMOL/L (ref 3.5–5.1)
PROTHROMBIN TIME: 15.7 SECONDS (ref 11.7–14.5)
RBC # BLD: 4.65 M/CU MM (ref 4.2–5.4)
SEGMENTED NEUTROPHILS ABSOLUTE COUNT: 11.7 K/CU MM
SEGMENTED NEUTROPHILS RELATIVE PERCENT: 88.6 % (ref 36–66)
SODIUM BLD-SCNC: 136 MMOL/L (ref 135–145)
TOTAL IMMATURE NEUTOROPHIL: 0.07 K/CU MM
TOTAL NUCLEATED RBC: 0 K/CU MM
TOTAL PROTEIN: 7.1 GM/DL (ref 6.4–8.2)
WBC # BLD: 13.2 K/CU MM (ref 4–10.5)

## 2020-08-02 PROCEDURE — 73560 X-RAY EXAM OF KNEE 1 OR 2: CPT

## 2020-08-02 PROCEDURE — 1200000000 HC SEMI PRIVATE

## 2020-08-02 PROCEDURE — 93010 ELECTROCARDIOGRAM REPORT: CPT | Performed by: INTERNAL MEDICINE

## 2020-08-02 PROCEDURE — 80053 COMPREHEN METABOLIC PANEL: CPT

## 2020-08-02 PROCEDURE — 93005 ELECTROCARDIOGRAM TRACING: CPT | Performed by: EMERGENCY MEDICINE

## 2020-08-02 PROCEDURE — 72125 CT NECK SPINE W/O DYE: CPT

## 2020-08-02 PROCEDURE — 85610 PROTHROMBIN TIME: CPT

## 2020-08-02 PROCEDURE — 82962 GLUCOSE BLOOD TEST: CPT

## 2020-08-02 PROCEDURE — 36415 COLL VENOUS BLD VENIPUNCTURE: CPT

## 2020-08-02 PROCEDURE — 72170 X-RAY EXAM OF PELVIS: CPT

## 2020-08-02 PROCEDURE — 6370000000 HC RX 637 (ALT 250 FOR IP): Performed by: STUDENT IN AN ORGANIZED HEALTH CARE EDUCATION/TRAINING PROGRAM

## 2020-08-02 PROCEDURE — 2580000003 HC RX 258: Performed by: STUDENT IN AN ORGANIZED HEALTH CARE EDUCATION/TRAINING PROGRAM

## 2020-08-02 PROCEDURE — 99285 EMERGENCY DEPT VISIT HI MDM: CPT

## 2020-08-02 PROCEDURE — 70450 CT HEAD/BRAIN W/O DYE: CPT

## 2020-08-02 PROCEDURE — 85025 COMPLETE CBC W/AUTO DIFF WBC: CPT

## 2020-08-02 RX ORDER — GABAPENTIN 100 MG/1
100 CAPSULE ORAL 3 TIMES DAILY
Status: DISCONTINUED | OUTPATIENT
Start: 2020-08-02 | End: 2020-08-04 | Stop reason: HOSPADM

## 2020-08-02 RX ORDER — ACETAMINOPHEN 650 MG/1
650 SUPPOSITORY RECTAL EVERY 6 HOURS PRN
Status: DISCONTINUED | OUTPATIENT
Start: 2020-08-02 | End: 2020-08-04 | Stop reason: HOSPADM

## 2020-08-02 RX ORDER — SODIUM CHLORIDE 0.9 % (FLUSH) 0.9 %
10 SYRINGE (ML) INJECTION PRN
Status: DISCONTINUED | OUTPATIENT
Start: 2020-08-02 | End: 2020-08-04 | Stop reason: HOSPADM

## 2020-08-02 RX ORDER — POLYETHYLENE GLYCOL 3350 17 G/17G
17 POWDER, FOR SOLUTION ORAL DAILY PRN
Status: DISCONTINUED | OUTPATIENT
Start: 2020-08-02 | End: 2020-08-04 | Stop reason: HOSPADM

## 2020-08-02 RX ORDER — POLYETHYLENE GLYCOL 3350 17 G/17G
17 POWDER, FOR SOLUTION ORAL DAILY
Status: DISCONTINUED | OUTPATIENT
Start: 2020-08-03 | End: 2020-08-04 | Stop reason: HOSPADM

## 2020-08-02 RX ORDER — SODIUM CHLORIDE 0.9 % (FLUSH) 0.9 %
10 SYRINGE (ML) INJECTION EVERY 12 HOURS SCHEDULED
Status: DISCONTINUED | OUTPATIENT
Start: 2020-08-02 | End: 2020-08-04 | Stop reason: HOSPADM

## 2020-08-02 RX ORDER — ACETAMINOPHEN 325 MG/1
650 TABLET ORAL EVERY 6 HOURS PRN
Status: DISCONTINUED | OUTPATIENT
Start: 2020-08-02 | End: 2020-08-04 | Stop reason: HOSPADM

## 2020-08-02 RX ORDER — PROMETHAZINE HYDROCHLORIDE 25 MG/1
12.5 TABLET ORAL EVERY 6 HOURS PRN
Status: DISCONTINUED | OUTPATIENT
Start: 2020-08-02 | End: 2020-08-04 | Stop reason: HOSPADM

## 2020-08-02 RX ORDER — ONDANSETRON 2 MG/ML
4 INJECTION INTRAMUSCULAR; INTRAVENOUS EVERY 6 HOURS PRN
Status: DISCONTINUED | OUTPATIENT
Start: 2020-08-02 | End: 2020-08-04 | Stop reason: HOSPADM

## 2020-08-02 RX ORDER — SERTRALINE HYDROCHLORIDE 25 MG/1
25 TABLET, FILM COATED ORAL NIGHTLY
Status: DISCONTINUED | OUTPATIENT
Start: 2020-08-02 | End: 2020-08-04 | Stop reason: HOSPADM

## 2020-08-02 RX ORDER — LEVOTHYROXINE SODIUM 0.05 MG/1
50 TABLET ORAL DAILY
Status: DISCONTINUED | OUTPATIENT
Start: 2020-08-03 | End: 2020-08-04 | Stop reason: HOSPADM

## 2020-08-02 RX ADMIN — SERTRALINE 25 MG: 25 TABLET, FILM COATED ORAL at 23:08

## 2020-08-02 RX ADMIN — GABAPENTIN 100 MG: 100 CAPSULE ORAL at 23:08

## 2020-08-02 RX ADMIN — SODIUM CHLORIDE, PRESERVATIVE FREE 10 ML: 5 INJECTION INTRAVENOUS at 23:09

## 2020-08-02 NOTE — ED NOTES
Patient is in bed resting and is alert but her normal level of confused. When asked how she is doing, she says her head hurts.      Sneha Davidson RN  08/02/20 1300

## 2020-08-02 NOTE — ED PROVIDER NOTES
Triage Chief Complaint:   Fall (unwitnessed from [facility] ); Head Injury (pt takes blood thinner ); and Knee Pain (lac to left knee)    [place]:  Taras Reyes is a 80 y.o. female that presents from her care facility after an unwitnessed fall. Noted to have swelling to her left upper head. Patient is on Eliquis. There is also a small laceration to her left knee. She complains of pain in her brain but is unable to tell me any other history of the fall. Per records, last tetanus vaccination was in February of this year. Per nurses conversation with nursing home report patient is alert and oriented x4 normally. Slurred speech is reportedly new since the fall. EMS was told by nursing home that her orientation is at baseline and she has had slurred speech since having a CVA previously. Patient's baseline mental status is unclear. ROS:   Review of Systems   Unable to perform ROS: Mental status change       Past Medical History:   Diagnosis Date    Atrial fibrillation ([address]     Cerebral artery occlusion with cerebral infarction ([address]     CVD (cardiovascular disease)     Dysphagia     GERD (gastroesophageal reflux disease)     Hypertension     Muscle weakness     Thyroid disease     hypo     Past Surgical History:   Procedure Laterality Date    APPENDECTOMY      BACK SURGERY      HIP SURGERY Right     repair    HYSTERECTOMY       History reviewed. No pertinent family history.   Social History     Socioeconomic History    Marital status:      Spouse name: Not on file    Number of children: Not on file    Years of education: Not on file    Highest education level: Not on file   Occupational History    Not on file   Social Needs    Financial resource strain: Not on file    Food insecurity     Worry: Not on file     Inability: Not on file    Transportation needs     Medical: Not on file     Non-medical: Not on file   Tobacco Use    Smoking status: Never Smoker    Smokeless tobacco: Never Used   Substance and Sexual Activity    Alcohol use: No    Drug use: No    Sexual activity: Not on file   Lifestyle    Physical activity     Days per week: Not on file     Minutes per session: Not on file    Stress: Not on file   Relationships    Social connections     Talks on phone: Not on file     Gets together: Not on file     Attends Adventism service: Not on file     Active member of club or organization: Not on file     Attends meetings of clubs or organizations: Not on file     Relationship status: Not on file    Intimate partner violence     Fear of current or ex partner: Not on file     Emotionally abused: Not on file     Physically abused: Not on file     Forced sexual activity: Not on file   Other Topics Concern    Not on file   Social History Narrative    Not on file     No current facility-administered medications for this encounter. Current Outpatient Medications   Medication Sig Dispense Refill    HYDROcodone-acetaminophen (NORCO) 5-325 MG per tablet       levothyroxine (SYNTHROID) 50 MCG tablet 50 mcg       sertraline (ZOLOFT) 25 MG tablet Take 25 mg by mouth nightly      gabapentin (NEURONTIN) 100 MG capsule Take 100 mg by mouth 3 times daily.  nitroGLYCERIN (NITROSTAT) 0.4 MG SL tablet Place 0.4 mg under the tongue every 5 minutes as needed for Chest pain up to max of 3 total doses. If no relief after 1 dose, call 911.  acetaminophen (APAP EXTRA STRENGTH) 500 MG tablet Take 1 tablet by mouth every 6 hours as needed for Pain 30 tablet 0    traMADol (ULTRAM) 25 MG split-tablet Take 25 mg by mouth nightly.       aluminum & magnesium hydroxide-simethicone (MAALOX) 200-200-20 MG/5ML SUSP suspension Take 30 mLs by mouth every 4 hours as needed for Indigestion      Menthol, Topical Analgesic, (BIOFREEZE EX) Apply topically 2 times daily       polyethylene glycol (GLYCOLAX) packet Take 17 g by mouth daily       Cholecalciferol (VITAMIN D3) 72299 units CAPS Take 1 capsule by mouth every 30 days       apixaban (ELIQUIS) 5 MG TABS tablet Take 5 mg by mouth 2 times daily        Allergies   Allergen Reactions    Pcn [Penicillins] Other (See Comments)       Nursing Notes Reviewed     Physical Exam:   ED Triage Vitals [08/02/20 0947]   Enc Vitals Group      BP       Pulse       Resp       Temp       Temp src       SpO2 97 %      Weight       Height       Head Circumference       Peak Flow       Pain Score       Pain Loc       Pain Edu? Excl. in [address]? BP (!) 160/90   Pulse 82   Temp 97.7 °F (36.5 °C) (Oral)   Resp 20   SpO2 99%   My pulse ox interpretation is - normal  Physical Exam  Constitutional:       Comments: Elderly appearing       HENT:      Head: Normocephalic. Nose: Nose normal. No congestion or rhinorrhea. Mouth/Throat:      Mouth: Mucous membranes are moist.      Pharynx: Oropharynx is clear. Eyes:      Extraocular Movements: Extraocular movements intact. Conjunctiva/sclera: Conjunctivae normal.      Pupils: Pupils are equal, round, and reactive to light. Cardiovascular:      Rate and Rhythm: Normal rate. Pulses: Normal pulses. Pulmonary:      Effort: Pulmonary effort is normal. No respiratory distress. Abdominal:      General: Abdomen is flat. There is no distension. Palpations: Abdomen is soft. Musculoskeletal:      Right hip: She exhibits tenderness. Left hip: She exhibits tenderness. Legs:    Skin:     General: Skin is warm and dry. Neurological:      Mental Status: She is alert. Comments: Unable to communicate clearly.   Unable to answer questions appropriately   Psychiatric:         Mood and Affect: Mood normal.         I have reviewed and interpreted all of the currently available lab results from this visit (if applicable):  Results for orders placed or performed during the hospital encounter of 08/02/20   CBC Auto Differential   Result Value Ref Range    WBC 13.2 (H) 4.0 - 10.5 K/CU MM    RBC 4. 65 4.2 - 5.4 M/CU MM    Hemoglobin 13.1 12.5 - 16.0 GM/DL    Hematocrit 41.3 37 - 47 %    MCV 88.8 78 - 100 FL    MCH 28.2 27 - 31 PG    MCHC 31.7 (L) 32.0 - 36.0 %    RDW 16.3 (H) 11.7 - 14.9 %    Platelets 271 674 - 590 K/CU MM    MPV 9.7 7.5 - 11.1 FL    Differential Type AUTOMATED DIFFERENTIAL     Segs Relative 88.6 (H) 36 - 66 %    Lymphocytes % 5.6 (L) 24 - 44 %    Monocytes % 5.0 (H) 0 - 4 %    Eosinophils % 0.1 0 - 3 %    Basophils % 0.2 0 - 1 %    Segs Absolute 11.7 K/CU MM    Lymphocytes Absolute 0.7 K/CU MM    Monocytes Absolute 0.7 K/CU MM    Eosinophils Absolute 0.0 K/CU MM    Basophils Absolute 0.0 K/CU MM    Nucleated RBC % 0.0 %    Total Nucleated RBC 0.0 K/CU MM    Total Immature Neutrophil 0.07 K/CU MM    Immature Neutrophil % 0.5 (H) 0 - 0.43 %   Comprehensive Metabolic Panel w/ Reflex to MG   Result Value Ref Range    Sodium 136 135 - 145 MMOL/L    Potassium 3.9 3.5 - 5.1 MMOL/L    Chloride 102 99 - 110 mMol/L    CO2 22 21 - 32 MMOL/L    BUN 20 6 - 23 MG/DL    CREATININE 1.0 0.6 - 1.1 MG/DL    Glucose 141 (H) 70 - 99 MG/DL    Calcium 9.4 8.3 - 10.6 MG/DL    Alb 4.0 3.4 - 5.0 GM/DL    Total Protein 7.1 6.4 - 8.2 GM/DL    Total Bilirubin 0.4 0.0 - 1.0 MG/DL    ALT 6 (L) 10 - 40 U/L    AST 13 (L) 15 - 37 IU/L    Alkaline Phosphatase 65 40 - 129 IU/L    GFR Non- 52 (L) >60 mL/min/1.73m2    GFR African American >60 >60 mL/min/1.73m2    Anion Gap 12 4 - 16   Protime-INR   Result Value Ref Range    Protime 15.7 (H) 11.7 - 14.5 SECONDS    INR 1.29 INDEX   POC Blood Glucose   Result Value Ref Range    Glucose 124 mg/dL   POCT Glucose   Result Value Ref Range    POC Glucose 124 (H) 70 - 99 MG/DL   EKG 12 Lead   Result Value Ref Range    Ventricular Rate 73 BPM    Atrial Rate 54 BPM    QRS Duration 80 ms    Q-T Interval 404 ms    QTc Calculation (Bazett) 445 ms    R Axis -43 degrees    T Axis 1 degrees    Diagnosis       Atrial fibrillation  Left axis deviation  Abnormal ECG  When compared with ECG of 17-FEB-2020 15:27,  Previous ECG has undetermined rhythm, needs review  Nonspecific T wave abnormality no longer evident in Lateral leads  QT has shortened        Radiographs (if obtained):  [] The following radiograph was interpreted by myself in the absence of a radiologist:  [x]Radiologist's Report Reviewed:  XR PELVIS (1-2 VIEWS)   Final Result   Bilateral hip osteoarthritis. Evidence remote ORIF of the right femoral neck. No acute fracture or dislocation. XR KNEE LEFT (1-2 VIEWS)   Preliminary Result   Mild tricompartmental osteoarthritis of the knee. No obvious acute osseous   abnormality. If pain persists, repeat films could be performed in a week. CT CERVICAL SPINE WO CONTRAST   Final Result   No acute intracranial abnormality. Large left-sided scalp hematoma. CT HEAD WO CONTRAST   Final Result   No acute intracranial abnormality. Large left-sided scalp hematoma. EKG (if obtained): (All EKG's are interpreted by myself in the absence of a cardiologist)  Atrial fibrillation with a rate of 73. QRS 80, QTc 445. No ST elevations or depressions. Normal T waves. Left axis deviation. Impression: Abnormal EKG. When compared to previous EKG from 2/17/2020, there are no significant changes. MDM:  Differential diagnoses considered include but are not limited to contusion, fracture, concussion, acute intracranial hemorrhage, electrolyte abnormality, dislocation, abrasion, laceration, skin tear. Patient placed in a c-collar upon arrival to the emergency department. Patient was taken to CT scan which showed no acute intracranial abnormalities it did show a large hematoma to patient the left forehead. CT scan of patient's C-spine is unremarkable. X-ray of the patient's pelvis is unremarkable except for osteoarthritis and x-ray of her knee shows osteoarthritis but no significant injuries. Skin and hair over her knee, no deep laceration. Tetanus is up-to-date. Patient continues to act slightly confused and is not answering questions appropriately. Unable to ascertain whether or not this is patient's baseline. Will plan to obtain a urine and admit patient to the hospital for further evaluation and treatment as she may have a severe concussion. I spoke with Dr. Juwan Rodriguez, who graciously agreed to admit the patient. I did don appropriate PPE (including N95 face mask, protective eye ware/safety glasses, gloves, hair covering, and no isolation gown), as recommended by the health facility/national standard best practice, during my bedside interactions with the patient. The likelihood of other entities in the differential is insufficient to justify any further testing for them. This was explained to the patient. The patient was advised that persistent or worsening symptoms would requirefurther evaluation. Clinical Impression:  1. Confusion    2. Fall from standing, initial encounter    3. Contusion of scalp, initial encounter    4. Skin tear of left elbow without complication, initial encounter          Radha Paige MD       Please note that portions of this note may have been complete with a voice recognition program.  Effortswere made to edit the dictations, but occasional words are mis-transcribed.           Radha Paige MD  08/02/20 [id number]

## 2020-08-02 NOTE — ED TRIAGE NOTES
Pt arrived via EMS from [facility] for unwitnessed fall. Pt has hematoma to left side of forehead, pt takes blood thinner, pt also has lac to left knee. Pt also has slurred speech. EMS reports pt baseline is slurred speech from previous CVA per nursing home and A/O x1 for EMS. This nurse called and spoke with Jay Jay Olivarez at [facility] who reports pt baseline A/Ox4 with no slurred speech. C-collar placed upon arrival to ED. Pt is A/O to first name. Jay Jay Olivarez also reports pt has emesis after fall.

## 2020-08-02 NOTE — H&P
alleviating or exacerbating factors. No radiation of the symptoms. 10-14 point ROS reviewed negative, unless as noted above    Objective:   No intake or output data in the 24 hours ending 08/02/20 1626   Vitals:   Vitals:    08/02/20 1258   BP: (!) 160/90   Pulse: 82   Resp: 20   Temp:    SpO2: 99%     Physical Exam:    GEN Awake female, laying in bed in no apparent distress. Appears given age. EYES Pupils are equally round. No scleral discharge  HENT Atraumatic and symmetric head  NECK No apparent thyromegaly  RESP Symmetric chest movement while on room air. CARDIO/VASC Peripheral pulses equal bilaterally and palpable. No peripheral edema. GI Abdomen is not distended. Rectal exam deferred.  Hdez catheter is not present. HEME/LYMPH No petechiae or ecchymoses. MSK Spontaneous movement of BL upper extremities  SKIN Normal coloration, warm, dry. NEURO Cranial nerves appear grossly intact  PSYCH Awake, alert. No oriented     Past Medical History: PMHx:   Past Medical History:   Diagnosis Date    Atrial fibrillation ([address]     Cerebral artery occlusion with cerebral infarction ([address]     CVD (cardiovascular disease)     Dysphagia     GERD (gastroesophageal reflux disease)     Hypertension     Muscle weakness     Thyroid disease     hypo     PSHx:  has a past surgical history that includes hip surgery (Right); Hysterectomy; Appendectomy; and back surgery. Allergies: Allergies   Allergen Reactions    Pcn [Penicillins] Other (See Comments)     Home Medications:   Prior to Admission medications    Medication Sig Start Date End Date Taking? Authorizing Provider   HYDROcodone-acetaminophen [facility]) 5-325 MG per tablet  2/20/20   Historical Provider, MD   levothyroxine (SYNTHROID) 50 MCG tablet 50 mcg  1/16/20   Historical Provider, MD   sertraline (ZOLOFT) 25 MG tablet Take 25 mg by mouth nightly    Historical Provider, MD   gabapentin (NEURONTIN) 100 MG capsule Take 100 mg by mouth 3 times daily. Historical Provider, MD   nitroGLYCERIN (NITROSTAT) 0.4 MG SL tablet Place 0.4 mg under the tongue every 5 minutes as needed for Chest pain up to max of 3 total doses. If no relief after 1 dose, call 911. Historical Provider, MD   acetaminophen (APAP EXTRA STRENGTH) 500 MG tablet Take 1 tablet by mouth every 6 hours as needed for Pain 2/17/20   CHARITY Sandy   traMADol (ULTRAM) 25 MG split-tablet Take 25 mg by mouth nightly.     Historical Provider, MD   aluminum & magnesium hydroxide-simethicone (MAALOX) 200-200-20 MG/5ML SUSP suspension Take 30 mLs by mouth every 4 hours as needed for Indigestion    Historical Provider, MD   Menthol, Topical Analgesic, (BIOFREEZE EX) Apply topically 2 times daily     Historical Provider, MD   polyethylene glycol (GLYCOLAX) packet Take 17 g by mouth daily     Historical Provider, MD   Cholecalciferol (VITAMIN D3) 81429 units CAPS Take 1 capsule by mouth every 30 days     Historical Provider, MD   apixaban (ELIQUIS) 5 MG TABS tablet Take 5 mg by mouth 2 times daily     Historical Provider, MD     FHx: reviewed and is noncontributory   SHx:   Social History     Socioeconomic History    Marital status:      Spouse name: None    Number of children: None    Years of education: None    Highest education level: None   Occupational History    None   Social Needs    Financial resource strain: None    Food insecurity     Worry: None     Inability: None    Transportation needs     Medical: None     Non-medical: None   Tobacco Use    Smoking status: Never Smoker    Smokeless tobacco: Never Used   Substance and Sexual Activity    Alcohol use: No    Drug use: No    Sexual activity: None   Lifestyle    Physical activity     Days per week: None     Minutes per session: None    Stress: None   Relationships    Social connections     Talks on phone: None     Gets together: None     Attends Jew service: None     Active member of club or organization: None     Attends meetings of clubs or organizations: None     Relationship status: None    Intimate partner violence     Fear of current or ex partner: None     Emotionally abused: None     Physically abused: None     Forced sexual activity: None   Other Topics Concern    None   Social History Narrative    None       Medications:   Medications:    Infusions:   PRN Meds:       Electronically signed by Tawny Moses DO on 8/2/2020 at 4:26 PM

## 2020-08-02 NOTE — ED NOTES
Bed: ED-32  Expected date:   Expected time:   Means of arrival:   Comments:  ems     Lolis Ortez  08/02/20 [id number]

## 2020-08-03 ENCOUNTER — APPOINTMENT (OUTPATIENT)
Dept: CT IMAGING | Age: 85
DRG: 083 | End: 2020-08-03
Payer: MEDICARE

## 2020-08-03 LAB
ANION GAP SERPL CALCULATED.3IONS-SCNC: 15 MMOL/L (ref 4–16)
BASOPHILS ABSOLUTE: 0 K/CU MM
BASOPHILS RELATIVE PERCENT: 0.2 % (ref 0–1)
BUN BLDV-MCNC: 17 MG/DL (ref 6–23)
CALCIUM SERPL-MCNC: 10 MG/DL (ref 8.3–10.6)
CHLORIDE BLD-SCNC: 102 MMOL/L (ref 99–110)
CO2: 23 MMOL/L (ref 21–32)
CREAT SERPL-MCNC: 0.8 MG/DL (ref 0.6–1.1)
DIFFERENTIAL TYPE: ABNORMAL
EOSINOPHILS ABSOLUTE: 0 K/CU MM
EOSINOPHILS RELATIVE PERCENT: 0 % (ref 0–3)
GFR AFRICAN AMERICAN: >60 ML/MIN/1.73M2
GFR NON-AFRICAN AMERICAN: >60 ML/MIN/1.73M2
GLUCOSE BLD-MCNC: 118 MG/DL (ref 70–99)
HCT VFR BLD CALC: 43.9 % (ref 37–47)
HEMOGLOBIN: 13.6 GM/DL (ref 12.5–16)
IMMATURE NEUTROPHIL %: 0.5 % (ref 0–0.43)
LYMPHOCYTES ABSOLUTE: 1.1 K/CU MM
LYMPHOCYTES RELATIVE PERCENT: 8.3 % (ref 24–44)
MCH RBC QN AUTO: 27.3 PG (ref 27–31)
MCHC RBC AUTO-ENTMCNC: 31 % (ref 32–36)
MCV RBC AUTO: 88 FL (ref 78–100)
MONOCYTES ABSOLUTE: 0.9 K/CU MM
MONOCYTES RELATIVE PERCENT: 7.3 % (ref 0–4)
NUCLEATED RBC %: 0 %
PDW BLD-RTO: 16.2 % (ref 11.7–14.9)
PLATELET # BLD: 240 K/CU MM (ref 140–440)
PMV BLD AUTO: 10.4 FL (ref 7.5–11.1)
POTASSIUM SERPL-SCNC: 4.4 MMOL/L (ref 3.5–5.1)
RBC # BLD: 4.99 M/CU MM (ref 4.2–5.4)
SEGMENTED NEUTROPHILS ABSOLUTE COUNT: 10.7 K/CU MM
SEGMENTED NEUTROPHILS RELATIVE PERCENT: 83.7 % (ref 36–66)
SODIUM BLD-SCNC: 140 MMOL/L (ref 135–145)
TOTAL IMMATURE NEUTOROPHIL: 0.06 K/CU MM
TOTAL NUCLEATED RBC: 0 K/CU MM
TSH HIGH SENSITIVITY: 0.33 UIU/ML (ref 0.27–4.2)
WBC # BLD: 12.8 K/CU MM (ref 4–10.5)

## 2020-08-03 PROCEDURE — 84443 ASSAY THYROID STIM HORMONE: CPT

## 2020-08-03 PROCEDURE — U0002 COVID-19 LAB TEST NON-CDC: HCPCS

## 2020-08-03 PROCEDURE — 6370000000 HC RX 637 (ALT 250 FOR IP): Performed by: NURSE PRACTITIONER

## 2020-08-03 PROCEDURE — 97163 PT EVAL HIGH COMPLEX 45 MIN: CPT

## 2020-08-03 PROCEDURE — 97167 OT EVAL HIGH COMPLEX 60 MIN: CPT

## 2020-08-03 PROCEDURE — 80048 BASIC METABOLIC PNL TOTAL CA: CPT

## 2020-08-03 PROCEDURE — 6370000000 HC RX 637 (ALT 250 FOR IP): Performed by: STUDENT IN AN ORGANIZED HEALTH CARE EDUCATION/TRAINING PROGRAM

## 2020-08-03 PROCEDURE — 85025 COMPLETE CBC W/AUTO DIFF WBC: CPT

## 2020-08-03 PROCEDURE — 97116 GAIT TRAINING THERAPY: CPT

## 2020-08-03 PROCEDURE — 92610 EVALUATE SWALLOWING FUNCTION: CPT | Performed by: SPEECH-LANGUAGE PATHOLOGIST

## 2020-08-03 PROCEDURE — 2580000003 HC RX 258: Performed by: STUDENT IN AN ORGANIZED HEALTH CARE EDUCATION/TRAINING PROGRAM

## 2020-08-03 PROCEDURE — 97535 SELF CARE MNGMENT TRAINING: CPT

## 2020-08-03 PROCEDURE — 1200000000 HC SEMI PRIVATE

## 2020-08-03 PROCEDURE — 94761 N-INVAS EAR/PLS OXIMETRY MLT: CPT

## 2020-08-03 PROCEDURE — 97530 THERAPEUTIC ACTIVITIES: CPT

## 2020-08-03 PROCEDURE — 99221 1ST HOSP IP/OBS SF/LOW 40: CPT | Performed by: INTERNAL MEDICINE

## 2020-08-03 PROCEDURE — 70450 CT HEAD/BRAIN W/O DYE: CPT

## 2020-08-03 PROCEDURE — 36415 COLL VENOUS BLD VENIPUNCTURE: CPT

## 2020-08-03 RX ORDER — ACETAMINOPHEN 325 MG/1
650 TABLET ORAL ONCE
Status: COMPLETED | OUTPATIENT
Start: 2020-08-03 | End: 2020-08-03

## 2020-08-03 RX ADMIN — SERTRALINE 25 MG: 25 TABLET, FILM COATED ORAL at 22:25

## 2020-08-03 RX ADMIN — SODIUM CHLORIDE, PRESERVATIVE FREE 10 ML: 5 INJECTION INTRAVENOUS at 11:06

## 2020-08-03 RX ADMIN — POLYETHYLENE GLYCOL 3350 17 G: 17 POWDER, FOR SOLUTION ORAL at 11:07

## 2020-08-03 RX ADMIN — ACETAMINOPHEN 650 MG: 325 TABLET ORAL at 22:26

## 2020-08-03 RX ADMIN — GABAPENTIN 100 MG: 100 CAPSULE ORAL at 22:26

## 2020-08-03 RX ADMIN — SODIUM CHLORIDE, PRESERVATIVE FREE 10 ML: 5 INJECTION INTRAVENOUS at 22:26

## 2020-08-03 RX ADMIN — GABAPENTIN 100 MG: 100 CAPSULE ORAL at 11:06

## 2020-08-03 RX ADMIN — ACETAMINOPHEN 650 MG: 325 TABLET ORAL at 16:37

## 2020-08-03 ASSESSMENT — PAIN SCALES - GENERAL
PAINLEVEL_OUTOF10: 0
PAINLEVEL_OUTOF10: 3
PAINLEVEL_OUTOF10: 0

## 2020-08-03 ASSESSMENT — PAIN SCALES - WONG BAKER: WONGBAKER_NUMERICALRESPONSE: 0

## 2020-08-03 NOTE — PROGRESS NOTES
depends, donning fresh depends)  · Toileting: maxA (doffing/donning depends, washing carlene area/buttocks)    Cognitive and Psychosocial Functioning:  · Overall cognitive status: Follows one step commands, increased processing time, min VCs to re-direct  · Affect: Pleasantly confused  Cognitive Patterns  Cognitive Pattern Assessment Used: BIMS  Repetition of Three Words (First Attempt): 0  Temporal Orientation: Year: Missed by > 5 years or no answer  Temporal Orientation: Month: Missed by > 1 month or no answer  Temporal Orientation: Day: Incorrect or no answer  Able to recall \"sock: No, could not recall  Able to recall \"blue\": No, could not recall  Able to recall \"bed\": No, could not recall  BIMS Summary Score: 0    Mobility:  · Supine to sit:  maxA  · Transfers: modA  · Sitting balance:  SBA . · Standing balance:  modA. · Toilet/Shower Transfers: DNT             AM-[facility] Daily Activity Inpatient   How much help for putting on and taking off regular lower body clothing?: Total  How much help for Bathing?: A Lot  How much help for Toileting?: Total  How much help for putting on and taking off regular upper body clothing?: A Little  How much help for taking care of personal grooming?: A Little  How much help for eating meals?: A Little  AM-[facility] Inpatient Daily Activity Raw Score: 13  AM-[facility] Inpatient ADL T-Scale Score : 32.03  ADL Inpatient CMS 0-100% Score: 63.03  ADL Inpatient CMS G-Code Modifier : CL    Treatment:  Self Care Training:   Cues were given for safety, sequence, UE/LE placement, visual cues, and balance. Activities performed today included LB bathing/dressing, grooming, toileting    Safety: patient left in chair with chair alarm, call light within reach, RN notified, gait belt used. Assessment:  Pt is a 81 yo female admitted from [place] for concussion syndrome.   Pt currently presents w/ deficits in ADL and high level IADL independence, functional activity tolerance, dynamic sitting and standing balance and tolerance and functional transfers, BUE strength, gross/fine motor coordination and cognition. Pt would benefit from continued acute care OT services w/ discharge to SNF  Complexity: High  Prognosis: Good, no significant barriers to participation at this time.    Plan  Times per week: 2x+  Times per day: Daily  Current Treatment Recommendations: Strengthening, Endurance Training, Neuromuscular Re-education, Patient/Caregiver Education & Training, Cognitive Reorientation, Equipment Evaluation, Education, & procurement, Self-Care / ADL, ROM, Balance Training, Pain Management, Home Management Training, Cognitive/Perceptual Training, Functional Mobility Training, Safety Education & Training, Positioning     Equipment: defer    Goals:  Pt goal: go home  Time Frame for STGs: discharge  Goal 1: Pt will perform UE ADLs SBA  Goal 2: Pt will perform LE ADLs SBA  Goal 3: Pt will perform toileting SBA  Goal 4: Pt will perform functional transfer w/ AD SBA  Goal 5: Pt will perform functional mobility w/ AD SBA  Goal 6: Pt will perform therex/theract in order to increase functional activity tolerance and dynamic standing balance    Treatment plan:  Pt will perform functional task in stand reaching in all 3 planes in order to increase dynamic standing balance and functional activity tolerance    Recommendations for NURSING activity: Up to chair for all 3 meals and up to [facility] for all toileting needs    Time:   Time in: 1636  Time out: 1720  Timed treatment minutes: 29 minutes  Total time: 44 minutes    Electronically signed by:    Kesha MCCURDY [id number]  5:43 PM,8/3/2020

## 2020-08-03 NOTE — CARE COORDINATION
Received consult for discharge planning. Noted patient in CT at this time for follow up imaging per Dr. Kristin Baires. PS to Dr. Kristin Baires requesting therapy evals for assistance with which level of care is appropriate for patient. Noted COVID screen ordered. Patient is from [address] [place] [address]. CM following for outcome of CT of head and therapy evaluations for dc planning with patient and/or her sons.

## 2020-08-03 NOTE — DISCHARGE INSTR - COC
Continuity of Care Form    Patient Name: Carly Ayala   :  1932  MRN:  [id number]    Admit date:  2020  Discharge date:  ***    Code Status Order: Full Code   Advance Directives:   [facility] Documentation     Date/Time Healthcare Directive Type of Healthcare Directive Copy in [facility] [address] Agent's Name Healthcare Agent's Phone Number    [phone]  Unknown, patient unable to respond due to medical condition -- -- -- -- --          Admitting Physician:  Mirta Yang DO  PCP: Yordy Zamora DO    Discharging Nurse: [facility] Unit/Room#: 3016/3016-A  Discharging Unit Phone Number: ***    Emergency Contact:   Extended Emergency Contact Information  Primary Emergency Contact: Eliezer Cast  Address: [address] [facility], [address] [place] of [address] Phone: [phone]  Relation: Child  Secondary Emergency Contact: Guillermo Cast  Address: [address] [place] [address], [address] [place] of [address] Phone: [phone]  Relation: Child    Past Surgical History:  Past Surgical History:   Procedure Laterality Date    APPENDECTOMY      BACK SURGERY      HIP SURGERY Right     repair    HYSTERECTOMY         Immunization History:   Immunization History   Administered Date(s) Administered    Tdap (Boostrix, Adacel) 2020       Active Problems:  Patient Active Problem List   Diagnosis Code    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    Transaminitis R74.0    Abdominal pain, epigastric R10.13    Non-intractable vomiting with nausea R11.2    Elevated liver enzymes R74.8    Concussion syndrome F07.81       Isolation/Infection:   Isolation          No Isolation        Patient Infection Status     Infection Onset Added Last Indicated Last Indicated By Review Planned Expiration Resolved Resolved By    COVID-19 Rule Out 20 Covid-19 Ambulatory (Ordered)              Nurse Assessment:  Last Vital Signs: BP (!) 167/92   Pulse 67   Temp 98.5 °F (36.9 °C) (Oral)   Resp 16   Wt 125 lb 10.6 oz (57 kg)   SpO2 100%   BMI 21.57 kg/m²     Last documented pain score (0-10 scale): Pain Level: 0  Last Weight:   Wt Readings from Last 1 Encounters:   08/03/20 125 lb 10.6 oz (57 kg)     Mental Status:  {IP PT MENTAL STATUS:48826}    IV Access:  { MARSHA IV ACCESS:483129776}    Nursing Mobility/ADLs:  Walking   {CHP DME VBDM:793138976}  Transfer  {CHP DME JESD:079818771}  Bathing  {CHP DME ENHN:774078122}  Dressing  {CHP DME IKDF:055969190}  Toileting  {CHP DME CFUY:807172683}  Feeding  {P DME HSEB:174594622}  Med Admin  {[facility] DME VSDT:513954703}  Med Delivery   {[facility] MED Delivery:932626292}    Wound Care Documentation and Therapy:        Elimination:  Continence:   · Bowel: {YES / GT:82668}  · Bladder: {YES / IF:31797}  Urinary Catheter: {Urinary Catheter:992521440}   Colostomy/Ileostomy/Ileal Conduit: {YES / LV:39997}       Date of Last BM: ***  No intake or output data in the 24 hours ending 08/03/20 1559  No intake/output data recorded.     Safety Concerns:     [address] [employer] Safety Concerns:553467440}    Impairments/Disabilities:      [address] [place] [employer] Impairments/Disabilities:152503226}    Patient's personal belongings (please select all that are sent with patient):  {[facility] DME Belongings:446253475}    RN SIGNATURE:  {Esignature:989651174}                  PHYSICIAN SECTION    Nutrition Therapy:  Current Nutrition Therapy:   [address] [place] [employer] Diet List:075346259}    Routes of Feeding: {CHP DME Other Feedings:478493856}  Liquids: {Slp liquid thickness:05232}  Daily Fluid Restriction: {CHP DME Yes amt example:986258807}  Last Modified Barium Swallow with Video (Video Swallowing Test): {Done Not Done [facility]:475477586}    Treatments at the Time of Hospital Discharge:   Respiratory Treatments: ***  Oxygen Therapy:  {Therapy; copd oxygen:45676}  Ventilator:    {MH CC Vent [facility]:056756677}    Rehab Therapies: {THERAPEUTIC INTERVENTION:0031008562}  Weight Bearing Status/Restrictions: [address] CC Weight Bearin}  Other Medical Equipment (for information only, NOT a DME order):  {EQUIPMENT:024499477}  Other Treatments: ***    Prognosis: {Prognosis:3552898918}    Condition at Discharge: [address] Patient Condition:334943445}    Rehab Potential (if transferring to Rehab): {Prognosis:2957886973}    Recommended Labs or Other Treatments After Discharge: ***    Physician Certification: I certify the above information and transfer of Lon Núñez  is necessary for the continuing treatment of the diagnosis listed and that she requires {Admit to Appropriate Level of Care:82079} for {GREATER/LESS:298902418} 30 days.      Update Admission H&P: {CHP DME Changes in SYW:446176244}    PHYSICIAN SIGNATURE:  {Esignature:835444961}

## 2020-08-03 NOTE — PROGRESS NOTES
Speech Language Pathology  Facility/Department: [address] [place] [address]   CLINICAL BEDSIDE SWALLOW EVALUATION    NAME: Rip Fam  : 1932  MRN: [id number]    Impression  Dysphagia Diagnosis: Mild to moderate pharyngeal stage dysphagia  Dysphagia Outcome Severity Scale: Level 4: Mild moderate dysphagia- Intermittent supervision/cueing. One - two diet consistencies restricted     Rip Fam was seen for a clinical bedside swallow evaluation following admission for fall, left scalp hematoma, and encephalopathy 2/2 head injury. H/o CVA with residual dysarthria, dysphagia, and GERD. Unable to reach caregivers at [facility] for baseline diet texture level. Per/chart review pt did not have aphasia at baseline. Nsg reports overt choking on OJ this am, meds held. Pt was alert, pleasant, and cooperative and unable to follow basic commands for oral motor exam likely 2/2 receptive aphasia and/or suspected underlying dementia. Right orofacial weakness observed with spontaneous facial expression. Vocal quality was WNL; unable to assess cough or volitional swallow. PO trials of ice-chips, thins by spoon and cup, nectar by straw, pureed, soft and regular solids completed. The oral phase was functional for all textures with adequate bolus containment, mastication (intermittent \"munching\" pattern noted), and clearance. Wet vocal quality and audible \"gulp\" indicating possible premature pharyngeal entry following trials of thins by spoon and cup with suspected deep penetration vs aspiration. Trials of nectar thick by straw and all solid trials were tolerated with no overt s/s aspiration. Speech and language were screened with receptive and expressive aphasia suspected. Pt was unable to answer open ended questions appropriately, and language output was primarily jargon. She did produce automatic greeting and was able to repeat some single words. Per/chart review pt had residual dysarthria only following CVA. Recommend:  Dysphagia 3/Nectar thick liquids. Supervision with feeding due to likely cognitive deficits, feed/eat slowly. Discussed diet level with nsg. ST will follow for safe diet tolerance and full speech language evaluation. ADMISSION DATE: 8/2/2020  ADMITTING DIAGNOSIS: has Acute acalculous cholecystitis; Transaminitis; Abdominal pain, epigastric; Non-intractable vomiting with nausea; Elevated liver enzymes; and Concussion syndrome on their problem list.  ONSET DATE: 8/2/2020     Recent Chest Xray/CT of Chest: no chest imaging completed this admission thus far    Date of Eval: 8/3/2020  Evaluating Therapist: Franco Goff    Current Diet level:  Current Diet : Regular  Current Liquid Diet : Thin    Primary Complaint  Patient Complaint: does not express c/o, endorses head pain    Pain:  Pain Assessment  Pain Assessment: Faces  Pain Level: 0  Muller-Baker Pain Rating: No hurt  Patient's Stated Pain Goal: No pain    Reason for Referral  Hiram Guzman was referred for a bedside swallow evaluation to assess the efficiency of her swallow function, identify signs and symptoms of aspiration and make recommendations regarding safe dietary consistencies, effective compensatory strategies, and safe eating environment.       Treatment Plan  Requires SLP Intervention: Yes  Duration/Frequency of Treatment: 3-5x/week x LOS  D/C Recommendations: SNF     Recommended Diet and Intervention  Diet Solids Recommendation: Dysphagia Soft and Bite-Sized (Dysphagia III)  Liquid Consistency Recommendation: Mildly Thick (Nectar)  Recommended Form of Meds: PO  Recommendations: Dysphagia treatment;Consider ice chips PRN  Therapeutic Interventions: Diet tolerance monitoring    Compensatory Swallowing Strategies  Compensatory Swallowing Strategies: Upright as possible for all oral intake;Eat/Feed slowly    Treatment/Goals  Short-term Goals  Timeframe for Short-term Goals: 1 week  Goal 1: Pt will tolerate dysphagia 3/Nectar thick liquids without clinical evidence for aspiration 100%  Goal 2: Pt will participate in MBSS as indicated. Goal 3: Caregivers will demonstrate understanding of aspiration precautions and diet level 100%  Long-term Goals  Timeframe for Long-term Goals: n/a    General  Chart Reviewed: Yes  Behavior/Cognition: Alert; Cooperative;Pleasant mood  Respiratory Status: Room air  O2 Device: None (Room air)  Communication Observation: Aphasia  Follows Directions: None  Dentition: Adequate  Patient Positioning: Upright in bed  Baseline Vocal Quality: Normal  Volitional Cough: (STEPHY)  Volitional Swallow: (STEPHY)  Prior Dysphagia History: h/o dysphagia, CVA  Consistencies Administered: Reg solid; Dysphagia Soft and Bite-Sized (Dysphagia III); Thin - cup; Thin - teaspoon;Nectar - straw; Ice Chips;Dysphagia Pureed (Dysphagia I)    Vision/Hearing  Hearing  Hearing: Within functional limits    Oral Motor Deficits  Oral/Motor  Oral Motor: Exceptions to [facility]  Labial ROM: Reduced right    Oral Phase Dysfunction  Oral Phase  Oral Phase: WNL     Indicators of Pharyngeal Phase Dysfunction   Pharyngeal Phase  Pharyngeal Phase: Exceptions  Indicators of Pharyngeal Phase Dysfunction  Delayed Swallow: Thin - cup  Wet Vocal Quality: Thin - cup  Throat Clearing - Immediate: Thin - cup    Prognosis  Prognosis  Prognosis for safe diet advancement: fair  Barriers to reach goals: cognitive deficits;language deficits  Barriers/Prognosis Comment: h/o dysphagia PTA  Individuals consulted  Consulted and agree with results and recommendations: RN    Education  Patient Education: results and recs  Patient Education Response: No evidence of learning  Safety Devices in place: Yes  Type of devices: All fall risk precautions in place; Left in bed       Therapy Time  SLP Individual Minutes  Time In: 1130  Time Out: 8042  Minutes: [address]zanares [place]  8/3/2020 12:18 PM

## 2020-08-03 NOTE — PROGRESS NOTES
Hospitalist Progress Note      Name:  Chantell Mendoza /Age/Sex: 1932  (80 y.o. female)   MRN & CSN:  [id number] & [id number] Admission Date/Time: 2020  9:40 AM   Location:  [facility]3016-A PCP: Morales Lloyd DO         Hospital Day: 2    Assessment and Plan:   Chantell Mendoza is a 80 y.o.  female  who presents with fall at nursing home. -- Fall at nursing home  -- Head injury  -- Left forehead scalp hematoma due to fall  -- Encephalopathy from head injury. Possible post concussion syndrome. CT head and C-spine obtained on admission negative for acute bony injury or intracranial hemorrhage. She is still very confused but otherwise awake and alert. Will repeat her CT head today just to make sure nothing has changed the last 24 hours. Continue to monitor mental status for now  PT/OT evaluation  Screen for COVID-19-needed to return back to nursing home. --- Mild leukocytosis [67799]  Possibly reactive from stress of falling. Not coughing. No fever. Urinalysis yet to be collected. Other diagnoses, medications continued unless contraindicated  Atrial fibrillation - hold apixaban. May not be safe to continue in this patient due to recurrent falls. Will d/w cardiology. Prior ischemic stroke with residual dysarthria. GERD  Hypertension   Hypothyroidism - check TSH    Diet DIET GENERAL;   DVT Prophylaxis [] Lovenox, []  Heparin, [] SCDs, [] Ambulation   GI Prophylaxis [] PPI,  [] H2 Blocker,  [] Carafate,  [] Diet/Tube Feeds   Code Status Full Code   Disposition  to be determined based on PT/OT recommendation   MDM [] Low, [x] Moderate,[]  High     History of Present Illness:   Patient seen and examined. Still very confused. Not able to understand what she says - sounds like a different language. I called patient's son who confirmed that her mother generally has difficulty speaking from her prior stroke but still would talk in clear [place] language.      Ten point ROS reviewed negative, unless as noted above    Objective:   No intake or output data in the 24 hours ending 08/03/20 1105   Vitals:   Vitals:    08/03/20 0158   BP: 138/74   Pulse: 73   Resp: 17   Temp:    SpO2: 98%     Physical Exam:   GEN Awake female, lying in bed. EYES Pupils are equally round. No scleral erythema, discharge, or conjunctivitis. HEAD scalp hematoma present over the left forehead. HENT Mucous membranes are moist. Oral pharynx without exudates, no evidence of thrush. RESP breathing comfortably on room air. CARDIO/VASC S1/S2 auscultated. Regular rate without appreciable murmurs. No peripheral edema. GI Abdomen is soft without significant tenderness, masses, or guarding. Bowel sounds are normoactive. MSK No gross joint deformities. SKIN Normal coloration, warm, dry. NEURO confused speech, no lateralizing weakness. PSYCH Awake, alert, very confused and not oriented.      Medications:   Medications:    apixaban  5 mg Oral BID    gabapentin  100 mg Oral TID    levothyroxine  50 mcg Oral Daily    sertraline  25 mg Oral Nightly    polyethylene glycol  17 g Oral Daily    sodium chloride flush  10 mL Intravenous 2 times per day      Infusions:   PRN Meds: sodium chloride flush, 10 mL, PRN  acetaminophen, 650 mg, Q6H PRN    Or  acetaminophen, 650 mg, Q6H PRN  polyethylene glycol, 17 g, Daily PRN  promethazine, 12.5 mg, Q6H PRN    Or  ondansetron, 4 mg, Q6H PRN        CBC   Recent Labs     08/02/20  1016 08/03/20  0336   WBC 13.2* 12.8*   HGB 13.1 13.6   HCT 41.3 43.9    240      BMP   Recent Labs     08/02/20  1016 08/03/20  0336    140   K 3.9 4.4    102   CO2 22 23   BUN 20 17   CREATININE 1.0 0.8       Radiology report reviewed     Electronically signed by Salvador Lock MD on 8/3/2020 at 11:05 AM

## 2020-08-03 NOTE — CONSULTS
CHART REVIEWED  FULL NOTE TO SHEREEN                      Name:  Dorina Proctor /Age/Sex: 1932  (80 y.o. female)   MRN & CSN:  [id number] & [id number] Admission Date/Time: 2020  9:40 AM   Location:  3016/3016-A PCP: Lexi Blackman 75 Day: 2          Referring physician:  Joanna Portillo DO         Reason for consultation:  anticoag recommendation        Thanks for referral.    Information source: chart/ staff    CC;  confusion      HPI:   Thank you for involving me in taking  care of Dorina Proctor who  is a 80 y. o.year  Old female  Presents with  H/o HTN, CVA, a fib now  With altered MS, very confused       Had ? Fall, has facial bruises, CT wasneg . Pt has been on eliquis. Past medical history:    has a past medical history of Atrial fibrillation ([address] Cerebral artery occlusion with cerebral infarction ([address] CVD (cardiovascular disease), Dysphagia, GERD (gastroesophageal reflux disease), Hypertension, Muscle weakness, and Thyroid disease. Past surgical history:   has a past surgical history that includes hip surgery (Right); Hysterectomy; Appendectomy; and back surgery. Social History:   reports that she has never smoked. She has never used smokeless tobacco. She reports that she does not drink alcohol or use drugs. Family history:  family history is not on file.     Allergies   Allergen Reactions    Pcn [Penicillins] Other (See Comments)       [Held by provider] apixaban (ELIQUIS) tablet 5 mg, BID  gabapentin (NEURONTIN) capsule 100 mg, TID  levothyroxine (SYNTHROID) tablet 50 mcg, Daily  sertraline (ZOLOFT) tablet 25 mg, Nightly  polyethylene glycol (GLYCOLAX) packet 17 g, Daily  sodium chloride flush 0.9 % injection 10 mL, 2 times per day  sodium chloride flush 0.9 % injection 10 mL, PRN  acetaminophen (TYLENOL) tablet 650 mg, Q6H PRN    Or  acetaminophen (TYLENOL) suppository 650 mg, Q6H PRN  polyethylene glycol (GLYCOLAX) packet 17 g, Daily PRN  promethazine (PHENERGAN) tablet 12.5 mg, Q6H PRN    Or  ondansetron (ZOFRAN) injection 4 mg, Q6H PRN      Current Facility-Administered Medications   Medication Dose Route Frequency Provider Last Rate Last Dose    [Held by provider] apixaban (ELIQUIS) tablet 5 mg  5 mg Oral BID [facility] Gra[facility], DO   Stopped at 08/03/20 1106    gabapentin (NEURONTIN) capsule 100 mg  100 mg Oral TID [facility], DO   100 mg at 08/03/20 1106    levothyroxine (SYNTHROID) tablet 50 mcg  50 mcg Oral Daily Parisa Goyal, DO        sertraline (ZOLOFT) tablet 25 mg  25 mg Oral Nightly Parisa Goyal, DO   25 mg at 08/02/20 2308    polyethylene glycol (GLYCOLAX) packet 17 g  17 g Oral Daily Parisa Goyal, DO   17 g at 08/03/20 1107    sodium chloride flush 0.9 % injection 10 mL  10 mL Intravenous 2 times per day [facility] Moel, DO   10 mL at 08/03/20 1106    sodium chloride flush 0.9 % injection 10 mL  10 mL Intravenous PRN [facility] Gravel, DO        acetaminophen (TYLENOL) tablet 650 mg  650 mg Oral Q6H PRN [facility] Gravel, DO   650 mg at 08/03/20 1637    Or    acetaminophen (TYLENOL) suppository 650 mg  650 mg Rectal Q6H PRN [facility] Gravel, DO        polyethylene glycol (GLYCOLAX) packet 17 g  17 g Oral Daily PRN [facility] Gravel, DO        promethazine (PHENERGAN) tablet 12.5 mg  12.5 mg Oral Q6H PRN [facility] Gravel, DO        Or    ondansetron (ZOFRAN) injection 4 mg  4 mg Intravenous Q6H PRN [facility] DO         Review of Systems:  confused    Physical Examination:    BP (!) 151/88   Pulse 78   Temp 97.2 °F (36.2 °C) (Axillary)   Resp 18   Wt 125 lb 10.6 oz (57 kg)   SpO2 100%   BMI 21.57 kg/m²      Wt Readings from Last 3 Encounters:   08/03/20 125 lb 10.6 oz (57 kg)   02/27/20 150 lb (68 kg)   02/18/20 155 lb (70.3 kg)     Body mass index is 21.57 kg/m².       General Appearance:  fair  Head: normocephalic     Eyes: normal, noninjected conjunctiva    ENT: normal mucosa, noninjected throat, normal     NECK: No JVP  No thyromegaly        Cardiovascular: No thrills palpated   Auscultation: Normal S1 and S2,  no murmur   carotid bruit no   Abdominal Aorta no bruit    Respiratory:    Breath sounds Diminshed bilaterally     Extremities:  none Edema clubbing ,   no cyanosis    SKIN: Warm and well perfused, no pallor or cyanosis    Vascular exam:  Pedal Pulses: palp  bilaterally        Abdomen:  No masses or tenderness. No organomegaly noted. Neurological:   confused    Lab Review   Recent Labs     08/03/20  0336   WBC 12.8*   HGB 13.6   HCT 43.9         Recent Labs     08/03/20  0336      K 4.4      CO2 23   BUN 17   CREATININE 0.8     Recent Labs     08/02/20  1016   AST 13*   ALT 6*   BILITOT 0.4   ALKPHOS 65     No results for input(s): TROPONINI in the last 72 hours.   No results found for: BNP  Lab Results   Component Value Date    INR 1.29 08/02/2020    PROTIME 15.7 (H) 08/02/2020           Assessment/Recommendations:     - A fib; only HR control, high risk of bleeding as has falls  - HTN stable         Severa Blotter, MD, 8/3/2020 5:33 PM

## 2020-08-03 NOTE — PROGRESS NOTES
(Wernicke's/fluent aphasia?; may benefit from SLP evaluation for speech/cognition)  Pain Screening  Patient Currently in Pain: Yes(pt endorses pain at site of head injury and headache)          Orientation  Orientation  Overall Orientation Status: Impaired  Orientation Level: Disoriented to situation;Disoriented to time  Social/Functional History  Social/Functional History  Type of Home: Assisted living  ADL Assistance: Needs assistance  Homemaking Assistance: Needs assistance  Ambulation Assistance: Independent(modified independent with A/D?; unable to obtain details of social/functional history due to patient's impaired cognition)  Transfer Assistance: Independent  Cognition   Cognition  Overall Cognitive Status: Exceptions  Arousal/Alertness: Delayed responses to stimuli  Following Commands: Inconsistently follows commands  Attention Span: Attends with cues to redirect  Memory: Decreased long term memory;Decreased short term memory  Safety Judgement: Decreased awareness of need for safety;Decreased awareness of need for assistance  Problem Solving: Decreased awareness of errors  Insights: Decreased awareness of deficits  Initiation: Requires cues for some  Sequencing: Requires cues for some    Objective             Strength RLE  Comment: knee extension: at least 3+/5 observed functionally  Strength LLE  Comment: knee extension: at least 3+/5 observed functionally     Sensation  Overall Sensation Status: WNL     Transfers  Sit to Stand: Moderate Assistance(with verbal cues to push through chair and avoid pulling on walker)  Stand to sit: Moderate Assistance(with verbal cues to feel chair against back of legs, reach back, and sit slowly)  Ambulation  Ambulation?: Yes  Ambulation 1  Surface: level tile  Device: Rolling Walker  Assistance: Minimal assistance; Moderate assistance  Quality of Gait: decreased gait speed, narrow BG, shuffling, decreased step length bilaterally, unsteady  Distance: 8 feet  Comments: with verbal and tactile cues for BLE placement, walker placement, and sequence throughout ambulation; with verbal and tactile cues to maintain upright posture in order to avoid COM shifting outside of BG     Balance  Posture: Poor(forward head, rounded shoulders, increased thoracic kyphosis)  Sitting - Static: Good  Sitting - Dynamic: Good  Standing - Static: Poor;+  Standing - Dynamic: Poor      Gait Training:  Cues were given for safety, sequence, device management, balance, posture, awareness, path. Therapeutic Activity Training:   Therapeutic activity training was instructed today. Cues were given for safety, sequence, UE/LE placement, awareness, and balance. Activities performed today included bed mobility training, sup-sit, sit-stand. Increased time required for all task completion. Plan   Plan  Times per week: 3+  Current Treatment Recommendations: Strengthening, ROM, Balance Training, Functional Mobility Training, Transfer Training, ADL/Self-care Training, Gait Training, Patient/Caregiver Education & Training, IADL Training, Stair training, Equipment Evaluation, Education, & procurement, Cognitive Reorientation, Cognitive/Perceptual Training, Neuromuscular Re-education, Pain Management, Home Exercise Program, Endurance Training, Positioning, Wheelchair Mobility Training, Safety Education & Training  Safety Devices  Type of devices: All fall risk precautions in place, Left in chair, Call light within reach, Chair alarm in place, Nurse notified, Gait belt, Patient at risk for falls      AM-PAC Score  AM-PAC Inpatient Mobility Raw Score : 11 (08/03/20 1854)  AM-PAC Inpatient T-Scale Score : 33.86 (08/03/20 1854)  Mobility Inpatient CMS 0-100% Score: 72.57 (08/03/20 1854)  Mobility Inpatient CMS G-Code Modifier : CL (08/03/20 1854)          Goals  Long term goals  Time Frame for Long term goals :  In one week:  Long term goal 1: Pt will complete all bed mobility with CGAx1  Long term goal 2: Pt will complete sit <> stand transfers with CGAx1  Long term goal 3: Pt will ambulate 50 feet with CGAx1 with LRAD  Long term goal 4: Pt will independently complete 3 sets of 10 reps of BLE AROM exercises in available and allowed ROM        Time In: 1740  Time Out: 1814  Total Treatment Time: 34  Timed Code Treatment Minutes: [address] Yaquelin Florez, PT, DPT  License #: [id number]

## 2020-08-04 VITALS
WEIGHT: 129.63 LBS | TEMPERATURE: 98 F | BODY MASS INDEX: 22.13 KG/M2 | HEART RATE: 78 BPM | DIASTOLIC BLOOD PRESSURE: 73 MMHG | OXYGEN SATURATION: 97 % | SYSTOLIC BLOOD PRESSURE: 153 MMHG | HEIGHT: 64 IN | RESPIRATION RATE: 15 BRPM

## 2020-08-04 LAB
SARS-COV-2: NOT DETECTED
SOURCE: NORMAL

## 2020-08-04 ASSESSMENT — PAIN SCALES - WONG BAKER: WONGBAKER_NUMERICALRESPONSE: 0

## 2020-08-04 ASSESSMENT — PAIN SCALES - GENERAL: PAINLEVEL_OUTOF10: 0

## 2020-08-04 NOTE — PROGRESS NOTES
Call initiated by: Nursing staff:  Lina Sauceda  Call addressed around: 8/4/2020 3:49 AM      Reason for call: I was notified by [facility] HAMOT @1706 of CT results. Orders placed:     1. Acute subarachnoid hemorrhage within the left perimesencephalic cistern    and adjacent cerebellum with mass effect on the brainstem. 2. Small acute extra-axial hemorrhage adjacent to the left anterior temporal    lobe. 3. Left scalp hematoma.  No calvarial fracture. The findings were sent to the Radiology Results [address] at 8:31    pm on 8/3/2020to be communicated to a licensed caregiver.      -Will transfer patient to [address] [place] [address] for Emergency contact persons Catherine Blackmon and Anna Patel on [phone] and [phone] respectively.   -Access center contacted by Dr. Ed Holder. Awaiting call back from [facility].         GABRIELA Baig - CNP

## 2020-08-04 NOTE — PROGRESS NOTES
This pt RN Shanice Escalonau and this RN moved this pt to [phone] at this time, closer to nurses station.

## 2020-08-04 NOTE — PROGRESS NOTES
Gave report to [facility] at [facility] [place].

## 2020-08-04 NOTE — PROGRESS NOTES
Call initiated by: Nursing staff: Alisia Felxi  Call addressed around: 8/3/2020 8:17 PM      Reason for call: Patient had an unwitnessed fall. Orders placed:     -Stat CT head noncontrast to rule out acute hemorrhage.  -Start neurochecks now then every 4 notify on call with positive changes.  -Anticoagulants already held. -Move patient to a room close to the nurses station. -LADARIUS      Medicated with nurse Hamilton.     GABRIELA Nicholson - CNP

## 2020-08-04 NOTE — DISCHARGE SUMMARY
Discharge Summary    Name:  Cheyanne Aguillon /Age/Sex: 1932  (80 y.o. female)   MRN & CSN:  [id number] & [id number] Admission Date/Time: 2020  9:40 AM   Attending:  No att. providers found Discharging Physician: Kellee Ma MD     Hospital Course: Cheyanne Aguillon is a 80 y.o.  female  who presents with fall at nursing home.      -- Subarachnoid hemorrhage due to head trauma  --  Unwitnessed fall at nursing home  -- Head injury  -- Left forehead scalp hematoma due to fall  -- Encephalopathy from head injury. CT head and C-spine obtained on admission negative for acute bony injury or intracranial hemorrhage. She was still very confused when assessed by the hospitalist next day and her speech was very garbled but otherwise she awake and alert. Repeat CT head obtained 24 hours after admission showed acute subarachnoid hemorrhage within the left perimesencephalic cistern adjacent cerebellum with mass-effect on the brainstem. She was subsequently transferred to [facility] for neurosurgical evaluation and management. She had been on Eliquis for A. fib prior to presentation and this was held during her hospital stay.     --- Mild leukocytosis [69041]  Possibly reactive from stress of falling. Not coughing. No fever. Urinalysis yet to be collected.     Other diagnoses, medications continued unless contraindicated  - Atrial fibrillation - Apixaban held. Hospitalist felt that patient should no longer be continued on anticoagulation due to recurrent falls and high risk of bleeding. Cardiologist assessed patient and concurred with this recommendation.    - Prior ischemic stroke with residual dysarthria.   - GERD  - Hypertension   - Hypothyroidism     The patient expressed appropriate understanding of and agreement with the discharge recommendations, medications, and plan.      Consults this admission:  IP CONSULT TO HOSPITALIST  IP CONSULT TO CASE MANAGEMENT  IP CONSULT TO CARDIOLOGY    Discharge Instruction:   Follow up appointments:N/A    Diet: N/A  Activity: N/A  Disposition: Transfer to [facility]  Condition on discharge: Stable    Discharge Medications:      Jasmeet Burton   Home Medication Instructions UNK:[id number]    Printed on:08/04/20 7291   Medication Information                      acetaminophen (APAP EXTRA STRENGTH) 500 MG tablet  Take 1 tablet by mouth every 6 hours as needed for Pain             aluminum & magnesium hydroxide-simethicone (MAALOX) 200-200-20 MG/5ML SUSP suspension  Take 30 mLs by mouth every 4 hours as needed for Indigestion             apixaban (ELIQUIS) 5 MG TABS tablet  Take 5 mg by mouth 2 times daily              Cholecalciferol (VITAMIN D3) 14982 units CAPS  Take 1 capsule by mouth every 30 days              gabapentin (NEURONTIN) 100 MG capsule  Take 100 mg by mouth 3 times daily. HYDROcodone-acetaminophen (NORCO) 5-325 MG per tablet               levothyroxine (SYNTHROID) 50 MCG tablet  50 mcg              Menthol, Topical Analgesic, (BIOFREEZE EX)  Apply topically 2 times daily              nitroGLYCERIN (NITROSTAT) 0.4 MG SL tablet  Place 0.4 mg under the tongue every 5 minutes as needed for Chest pain up to max of 3 total doses. If no relief after 1 dose, call 911. polyethylene glycol (GLYCOLAX) packet  Take 17 g by mouth daily              sertraline (ZOLOFT) 25 MG tablet  Take 25 mg by mouth nightly             traMADol (ULTRAM) 25 MG split-tablet  Take 25 mg by mouth nightly. Objective Findings at Discharge:   BP (!) 153/73   Pulse 78   Temp 98 °F (36.7 °C) (Oral)   Resp 15   Ht 5' 4.02\" (1.626 m)   Wt 129 lb 10.1 oz (58.8 kg)   SpO2 97%   BMI 22.24 kg/m²            PHYSICAL EXAM (done earlier in the morning on 8/3/2020]  GEN    Awake female, lying in bed. EYES   Pupils are equally round. No scleral erythema, discharge, or conjunctivitis. HEAD scalp hematoma present over the left forehead.   HENT  Mucous membranes are moist. Oral pharynx without exudates, no evidence of thrush. RESP  breathing comfortably on room air. CARDIO/VASC           S1/S2 auscultated. Regular rate without appreciable murmurs. No peripheral edema. GI        Abdomen is soft without significant tenderness, masses, or guarding. Bowel sounds are normoactive. MSK    No gross joint deformities. SKIN    Normal coloration, warm, dry. NEURO           confused speech, no lateralizing weakness. PSYCH            Awake, alert, very confused and not oriented. BMP/CBC  Recent Labs     08/02/20  1016 08/03/20  0336    140   K 3.9 4.4    102   CO2 22 23   BUN 20 17   CREATININE 1.0 0.8   WBC 13.2* 12.8*   HCT 41.3 43.9    240       IMAGING:  CT head 8/3/20  1. Acute subarachnoid hemorrhage within the left perimesencephalic cistern    and adjacent cerebellum with mass effect on the brainstem. 2. Small acute extra-axial hemorrhage adjacent to the left anterior temporal    lobe. 3. Left scalp hematoma.  No calvarial fracture. The findings were sent to the Radiology Results [address] at 8:31    pm on 8/3/2020to be communicated to a licensed caregiver.       CT head and C spine 8/3/20  No acute intracranial abnormality.         Large left-sided scalp hematoma.             Discharge Time of 35  minutes    Electronically signed by Hussein Dee MD on 8/4/2020 at 11:39 AM

## 2020-08-04 NOTE — PROGRESS NOTES
[employer] called and said all air flights are refusing to fly tonight due to the weather. The quickest mobile ICU (MICU) is 30-40 minutes away to transport pt to [facility] ICU. [facility] ICU back and relayed this information to them.

## 2020-08-06 LAB
EKG DIAGNOSIS: NORMAL
EKG Q-T INTERVAL: 404 MS
EKG QRS DURATION: 80 MS
EKG QTC CALCULATION (BAZETT): 445 MS
EKG R AXIS: -43 DEGREES
EKG T AXIS: 1 DEGREES
EKG VENTRICULAR RATE: 73 BPM

## 2020-08-23 ENCOUNTER — HOSPITAL ENCOUNTER (INPATIENT)
Age: 85
LOS: 4 days | Discharge: NURSING, REHAB OR LONG-TERM CARE | DRG: 689 | End: 2020-08-27
Attending: EMERGENCY MEDICINE | Admitting: STUDENT IN AN ORGANIZED HEALTH CARE EDUCATION/TRAINING PROGRAM
Payer: MEDICARE

## 2020-08-23 ENCOUNTER — APPOINTMENT (OUTPATIENT)
Dept: CT IMAGING | Age: 85
DRG: 689 | End: 2020-08-23
Payer: MEDICARE

## 2020-08-23 ENCOUNTER — APPOINTMENT (OUTPATIENT)
Dept: GENERAL RADIOLOGY | Age: 85
DRG: 689 | End: 2020-08-23
Payer: MEDICARE

## 2020-08-23 PROBLEM — G93.40 ENCEPHALOPATHY ACUTE: Status: ACTIVE | Noted: 2020-08-23

## 2020-08-23 LAB
ALBUMIN SERPL-MCNC: 3 GM/DL (ref 3.4–5)
ALP BLD-CCNC: 95 IU/L (ref 40–129)
ALT SERPL-CCNC: 10 U/L (ref 10–40)
AMORPHOUS: ABNORMAL /HPF
ANION GAP SERPL CALCULATED.3IONS-SCNC: 11 MMOL/L (ref 4–16)
APTT: 39 SECONDS (ref 25.1–37.1)
AST SERPL-CCNC: 15 IU/L (ref 15–37)
BACTERIA: NEGATIVE /HPF
BASE EXCESS MIXED: 4.2 (ref 0–2.3)
BASOPHILS ABSOLUTE: 0.1 K/CU MM
BASOPHILS RELATIVE PERCENT: 0.6 % (ref 0–1)
BILIRUB SERPL-MCNC: 0.4 MG/DL (ref 0–1)
BILIRUBIN URINE: NEGATIVE MG/DL
BLOOD, URINE: NEGATIVE
BUN BLDV-MCNC: 21 MG/DL (ref 6–23)
CALCIUM SERPL-MCNC: 9.2 MG/DL (ref 8.3–10.6)
CHLORIDE BLD-SCNC: 98 MMOL/L (ref 99–110)
CLARITY: CLEAR
CO2: 23 MMOL/L (ref 21–32)
COLOR: YELLOW
COMMENT: ABNORMAL
CREAT SERPL-MCNC: 0.8 MG/DL (ref 0.6–1.1)
DIFFERENTIAL TYPE: ABNORMAL
EOSINOPHILS ABSOLUTE: 0 K/CU MM
EOSINOPHILS RELATIVE PERCENT: 0.2 % (ref 0–3)
GFR AFRICAN AMERICAN: >60 ML/MIN/1.73M2
GFR NON-AFRICAN AMERICAN: >60 ML/MIN/1.73M2
GLUCOSE BLD-MCNC: 123 MG/DL (ref 70–99)
GLUCOSE BLD-MCNC: 123 MG/DL (ref 70–99)
GLUCOSE, URINE: NEGATIVE MG/DL
HCO3 VENOUS: 28.4 MMOL/L (ref 19–25)
HCT VFR BLD CALC: 39.7 % (ref 37–47)
HEMOGLOBIN: 12.7 GM/DL (ref 12.5–16)
IMMATURE NEUTROPHIL %: 0.7 % (ref 0–0.43)
INR BLD: 0.92 INDEX
KETONES, URINE: NEGATIVE MG/DL
LACTATE: 4.6 MMOL/L (ref 0.4–2)
LEUKOCYTE ESTERASE, URINE: ABNORMAL
LIPASE: 23 IU/L (ref 13–60)
LYMPHOCYTES ABSOLUTE: 1.5 K/CU MM
LYMPHOCYTES RELATIVE PERCENT: 18.7 % (ref 24–44)
MCH RBC QN AUTO: 28.3 PG (ref 27–31)
MCHC RBC AUTO-ENTMCNC: 32 % (ref 32–36)
MCV RBC AUTO: 88.6 FL (ref 78–100)
MONOCYTES ABSOLUTE: 0.7 K/CU MM
MONOCYTES RELATIVE PERCENT: 8.8 % (ref 0–4)
NITRITE URINE, QUANTITATIVE: NEGATIVE
NUCLEATED RBC %: 0 %
O2 SAT, VEN: 85 % (ref 50–70)
PCO2, VEN: 40 MMHG (ref 38–52)
PDW BLD-RTO: 15.7 % (ref 11.7–14.9)
PH VENOUS: 7.46 (ref 7.32–7.42)
PH, URINE: 8 (ref 5–8)
PLATELET # BLD: 404 K/CU MM (ref 140–440)
PMV BLD AUTO: 8.9 FL (ref 7.5–11.1)
PO2, VEN: 51 MMHG (ref 28–48)
POTASSIUM SERPL-SCNC: 4.2 MMOL/L (ref 3.5–5.1)
PRO-BNP: 774.9 PG/ML
PROTEIN UA: NEGATIVE MG/DL
PROTHROMBIN TIME: 11.1 SECONDS (ref 11.7–14.5)
RBC # BLD: 4.48 M/CU MM (ref 4.2–5.4)
RBC URINE: <1 /HPF (ref 0–6)
REASON FOR REJECTION: NORMAL
REJECTED TEST: NORMAL
SEGMENTED NEUTROPHILS ABSOLUTE COUNT: 5.8 K/CU MM
SEGMENTED NEUTROPHILS RELATIVE PERCENT: 71 % (ref 36–66)
SODIUM BLD-SCNC: 132 MMOL/L (ref 135–145)
SPECIFIC GRAVITY UA: 1.01 (ref 1–1.03)
SQUAMOUS EPITHELIAL: 1 /HPF
TOTAL IMMATURE NEUTOROPHIL: 0.06 K/CU MM
TOTAL NUCLEATED RBC: 0 K/CU MM
TOTAL PROTEIN: 6.2 GM/DL (ref 6.4–8.2)
TRICHOMONAS: ABNORMAL /HPF
TROPONIN T: <0.01 NG/ML
UROBILINOGEN, URINE: NORMAL MG/DL (ref 0.2–1)
WBC # BLD: 8.2 K/CU MM (ref 4–10.5)
WBC UA: 8 /HPF (ref 0–5)

## 2020-08-23 PROCEDURE — 96367 TX/PROPH/DG ADDL SEQ IV INF: CPT

## 2020-08-23 PROCEDURE — 82805 BLOOD GASES W/O2 SATURATION: CPT

## 2020-08-23 PROCEDURE — 6360000002 HC RX W HCPCS: Performed by: PHYSICIAN ASSISTANT

## 2020-08-23 PROCEDURE — 87040 BLOOD CULTURE FOR BACTERIA: CPT

## 2020-08-23 PROCEDURE — 81001 URINALYSIS AUTO W/SCOPE: CPT

## 2020-08-23 PROCEDURE — 99285 EMERGENCY DEPT VISIT HI MDM: CPT

## 2020-08-23 PROCEDURE — 94761 N-INVAS EAR/PLS OXIMETRY MLT: CPT

## 2020-08-23 PROCEDURE — 93005 ELECTROCARDIOGRAM TRACING: CPT | Performed by: PHYSICIAN ASSISTANT

## 2020-08-23 PROCEDURE — 85610 PROTHROMBIN TIME: CPT

## 2020-08-23 PROCEDURE — 87077 CULTURE AEROBIC IDENTIFY: CPT

## 2020-08-23 PROCEDURE — 80053 COMPREHEN METABOLIC PANEL: CPT

## 2020-08-23 PROCEDURE — 85025 COMPLETE CBC W/AUTO DIFF WBC: CPT

## 2020-08-23 PROCEDURE — 2580000003 HC RX 258: Performed by: PHYSICIAN ASSISTANT

## 2020-08-23 PROCEDURE — 83690 ASSAY OF LIPASE: CPT

## 2020-08-23 PROCEDURE — 1200000000 HC SEMI PRIVATE

## 2020-08-23 PROCEDURE — 87186 SC STD MICRODIL/AGAR DIL: CPT

## 2020-08-23 PROCEDURE — 96368 THER/DIAG CONCURRENT INF: CPT

## 2020-08-23 PROCEDURE — 71250 CT THORAX DX C-: CPT

## 2020-08-23 PROCEDURE — 71045 X-RAY EXAM CHEST 1 VIEW: CPT

## 2020-08-23 PROCEDURE — 70450 CT HEAD/BRAIN W/O DYE: CPT

## 2020-08-23 PROCEDURE — 83880 ASSAY OF NATRIURETIC PEPTIDE: CPT

## 2020-08-23 PROCEDURE — 87086 URINE CULTURE/COLONY COUNT: CPT

## 2020-08-23 PROCEDURE — 82962 GLUCOSE BLOOD TEST: CPT

## 2020-08-23 PROCEDURE — 96365 THER/PROPH/DIAG IV INF INIT: CPT

## 2020-08-23 PROCEDURE — 85730 THROMBOPLASTIN TIME PARTIAL: CPT

## 2020-08-23 PROCEDURE — 84484 ASSAY OF TROPONIN QUANT: CPT

## 2020-08-23 PROCEDURE — 83605 ASSAY OF LACTIC ACID: CPT

## 2020-08-23 RX ORDER — ACETAMINOPHEN 650 MG/1
650 SUPPOSITORY RECTAL EVERY 6 HOURS PRN
Status: DISCONTINUED | OUTPATIENT
Start: 2020-08-23 | End: 2020-08-27 | Stop reason: HOSPADM

## 2020-08-23 RX ORDER — IPRATROPIUM BROMIDE AND ALBUTEROL SULFATE 2.5; .5 MG/3ML; MG/3ML
1 SOLUTION RESPIRATORY (INHALATION)
Status: DISCONTINUED | OUTPATIENT
Start: 2020-08-24 | End: 2020-08-24

## 2020-08-23 RX ORDER — SODIUM CHLORIDE 9 MG/ML
INJECTION, SOLUTION INTRAVENOUS CONTINUOUS
Status: DISCONTINUED | OUTPATIENT
Start: 2020-08-23 | End: 2020-08-24

## 2020-08-23 RX ORDER — ONDANSETRON 2 MG/ML
4 INJECTION INTRAMUSCULAR; INTRAVENOUS EVERY 6 HOURS PRN
Status: CANCELLED | OUTPATIENT
Start: 2020-08-23

## 2020-08-23 RX ORDER — SODIUM CHLORIDE 0.9 % (FLUSH) 0.9 %
10 SYRINGE (ML) INJECTION EVERY 12 HOURS SCHEDULED
Status: DISCONTINUED | OUTPATIENT
Start: 2020-08-23 | End: 2020-08-27 | Stop reason: HOSPADM

## 2020-08-23 RX ORDER — SODIUM CHLORIDE 0.9 % (FLUSH) 0.9 %
10 SYRINGE (ML) INJECTION PRN
Status: DISCONTINUED | OUTPATIENT
Start: 2020-08-23 | End: 2020-08-27 | Stop reason: HOSPADM

## 2020-08-23 RX ORDER — ASPIRIN 300 MG/1
300 SUPPOSITORY RECTAL DAILY
Status: DISCONTINUED | OUTPATIENT
Start: 2020-08-24 | End: 2020-08-26

## 2020-08-23 RX ORDER — LEVOTHYROXINE SODIUM 0.05 MG/1
50 TABLET ORAL DAILY
Status: DISCONTINUED | OUTPATIENT
Start: 2020-08-24 | End: 2020-08-27 | Stop reason: HOSPADM

## 2020-08-23 RX ORDER — ONDANSETRON 2 MG/ML
4 INJECTION INTRAMUSCULAR; INTRAVENOUS EVERY 6 HOURS PRN
Status: DISCONTINUED | OUTPATIENT
Start: 2020-08-23 | End: 2020-08-27 | Stop reason: HOSPADM

## 2020-08-23 RX ORDER — POLYETHYLENE GLYCOL 3350 17 G/17G
17 POWDER, FOR SOLUTION ORAL DAILY PRN
Status: DISCONTINUED | OUTPATIENT
Start: 2020-08-23 | End: 2020-08-27 | Stop reason: HOSPADM

## 2020-08-23 RX ORDER — ASPIRIN 81 MG/1
81 TABLET ORAL DAILY
Status: DISCONTINUED | OUTPATIENT
Start: 2020-08-24 | End: 2020-08-26

## 2020-08-23 RX ORDER — PROMETHAZINE HYDROCHLORIDE 25 MG/1
12.5 TABLET ORAL EVERY 6 HOURS PRN
Status: CANCELLED | OUTPATIENT
Start: 2020-08-23

## 2020-08-23 RX ORDER — PROMETHAZINE HYDROCHLORIDE 25 MG/1
12.5 TABLET ORAL EVERY 6 HOURS PRN
Status: DISCONTINUED | OUTPATIENT
Start: 2020-08-23 | End: 2020-08-27 | Stop reason: HOSPADM

## 2020-08-23 RX ORDER — ROSUVASTATIN CALCIUM 20 MG/1
40 TABLET, COATED ORAL NIGHTLY
Status: DISCONTINUED | OUTPATIENT
Start: 2020-08-23 | End: 2020-08-27 | Stop reason: HOSPADM

## 2020-08-23 RX ORDER — LEVOFLOXACIN 5 MG/ML
500 INJECTION, SOLUTION INTRAVENOUS ONCE
Status: COMPLETED | OUTPATIENT
Start: 2020-08-23 | End: 2020-08-23

## 2020-08-23 RX ORDER — LEVOFLOXACIN 5 MG/ML
250 INJECTION, SOLUTION INTRAVENOUS EVERY 24 HOURS
Status: DISCONTINUED | OUTPATIENT
Start: 2020-08-24 | End: 2020-08-24

## 2020-08-23 RX ORDER — 0.9 % SODIUM CHLORIDE 0.9 %
1000 INTRAVENOUS SOLUTION INTRAVENOUS ONCE
Status: COMPLETED | OUTPATIENT
Start: 2020-08-23 | End: 2020-08-23

## 2020-08-23 RX ORDER — ACETAMINOPHEN 325 MG/1
650 TABLET ORAL EVERY 6 HOURS PRN
Status: DISCONTINUED | OUTPATIENT
Start: 2020-08-23 | End: 2020-08-27 | Stop reason: HOSPADM

## 2020-08-23 RX ADMIN — VANCOMYCIN HYDROCHLORIDE 1000 MG: 1 INJECTION, POWDER, LYOPHILIZED, FOR SOLUTION INTRAVENOUS at 19:29

## 2020-08-23 RX ADMIN — LEVOFLOXACIN 500 MG: 5 INJECTION, SOLUTION INTRAVENOUS at 19:00

## 2020-08-23 RX ADMIN — CEFEPIME 2 G: 2 INJECTION, POWDER, FOR SOLUTION INTRAVENOUS at 19:00

## 2020-08-23 RX ADMIN — SODIUM CHLORIDE 1000 ML: 9 INJECTION, SOLUTION INTRAVENOUS at 19:00

## 2020-08-23 ASSESSMENT — PAIN SCALES - WONG BAKER: WONGBAKER_NUMERICALRESPONSE: 2

## 2020-08-23 ASSESSMENT — PAIN SCALES - GENERAL: PAINLEVEL_OUTOF10: 0

## 2020-08-23 NOTE — ED PROVIDER NOTES
eMERGENCY dEPARTMENT eNCOUnter      PCP: Guera Torre DO    CHIEF COMPLAINT    Chief Complaint   Patient presents with    Altered Mental Status     EMS reports pt has been alert and responsive for them since their arrival to [facility].  Head Injury     pt had reported head injury 1 week ago per nursing home report     Pt was seen by physician    EUGENIA    Lashell Kern is a 80 y.o. female who presents via EMS from nursing home for altered mental status and being found unresponsive. Patient had a fall with head injury 8/2/2020. She did have a negative CT scan on that day but was admitted for some persistent confusion. She was on Eliquis at that time. She had a repeat CAT scan the following day which showed an acute subarachnoid hemorrhage and she was transferred to outside hospital facility. It was reported by nursing home staff to EMS that patient returned somewhat normal and was at dinner today around 415. Around 515 this afternoon she was found unresponsive. For EMS patient was responsive answering some questions. There is no fevers reported, infectious symptoms reported. Patient is currently DNR [facility].       REVIEW OF SYSTEMS    Unable to obtain due to pt acuity    All other review of systems are negative  See HPI and nursing notes for additional information     PAST MEDICAL AND SURGICAL HISTORY    Past Medical History:   Diagnosis Date    Atrial fibrillation ([address]     Cerebral artery occlusion with cerebral infarction ([address]     CVD (cardiovascular disease)     Dysphagia     GERD (gastroesophageal reflux disease)     Hypertension     Muscle weakness     Thyroid disease     hypo     Past Surgical History:   Procedure Laterality Date    APPENDECTOMY      BACK SURGERY      HIP SURGERY Right     repair    HYSTERECTOMY         CURRENT MEDICATIONS    Current Outpatient Rx   Medication Sig Dispense Refill    HYDROcodone-acetaminophen (NORCO) 5-325 MG per tablet       levothyroxine (SYNTHROID) 50 MCG tablet 50 mcg       sertraline (ZOLOFT) 25 MG tablet Take 25 mg by mouth nightly      gabapentin (NEURONTIN) 100 MG capsule Take 100 mg by mouth 3 times daily.  nitroGLYCERIN (NITROSTAT) 0.4 MG SL tablet Place 0.4 mg under the tongue every 5 minutes as needed for Chest pain up to max of 3 total doses. If no relief after 1 dose, call 911.  acetaminophen (APAP EXTRA STRENGTH) 500 MG tablet Take 1 tablet by mouth every 6 hours as needed for Pain 30 tablet 0    traMADol (ULTRAM) 25 MG split-tablet Take 25 mg by mouth nightly.       aluminum & magnesium hydroxide-simethicone (MAALOX) 200-200-20 MG/5ML SUSP suspension Take 30 mLs by mouth every 4 hours as needed for Indigestion      Menthol, Topical Analgesic, (BIOFREEZE EX) Apply topically 2 times daily       polyethylene glycol (GLYCOLAX) packet Take 17 g by mouth daily       Cholecalciferol (VITAMIN D3) 59909 units CAPS Take 1 capsule by mouth every 30 days       apixaban (ELIQUIS) 5 MG TABS tablet Take 5 mg by mouth 2 times daily          ALLERGIES    Allergies   Allergen Reactions    Pcn [Penicillins] Other (See Comments)       SOCIAL AND FAMILY HISTORY    Social History     Socioeconomic History    Marital status:      Spouse name: None    Number of children: None    Years of education: None    Highest education level: None   Occupational History    None   Social Needs    Financial resource strain: None    Food insecurity     Worry: None     Inability: None    Transportation needs     Medical: None     Non-medical: None   Tobacco Use    Smoking status: Never Smoker    Smokeless tobacco: Never Used   Substance and Sexual Activity    Alcohol use: No    Drug use: No    Sexual activity: None   Lifestyle    Physical activity     Days per week: None     Minutes per session: None    Stress: None   Relationships    Social connections     Talks on phone: None     Gets together: None     Attends Anglican service: None     Active member of club or organization: None     Attends meetings of clubs or organizations: None     Relationship status: None    Intimate partner violence     Fear of current or ex partner: None     Emotionally abused: None     Physically abused: None     Forced sexual activity: None   Other Topics Concern    None   Social History Narrative    None     History reviewed. No pertinent family history. PHYSICAL EXAM    VITAL SIGNS: /83   Pulse 86   Temp 98.4 °F (36.9 °C) (Oral)   Resp 18   Ht 5' 4\" (1.626 m)   Wt 129 lb (58.5 kg)   SpO2 98%   BMI 22.14 kg/m²    Constitutional:  Well developed, Well nourished  HENT:  Normocephalic,  PERRL. EOMI. Sclera clear. Conjunctiva normal, No discharge. Old bruising to the left frontal region, left periorbital region  Neck/Lymphatics: supple, no JVD, no swollen nodes  Cardiovascular:  Normal heart rate, Normal rhythm, No murmurs  Respiratory:  Nonlabored breathing. Normal breath sounds, No wheezing  Abdomen: Bowel sounds normal, Soft, No appreciable tenderness, no masses. Musculoskeletal: No edema, No tenderness, No cyanosis  Integument:  Warm, Dry  Neurologic:  Alert & oriented to person only , No focal deficits noted. Patient is tracking my movement side to side. Does move all 4 extremities spontaneously.   Generalized gross motor weakness to all 4 extremities  Psychiatric:  Affect normal, Mood normal.       Labs:  Results for orders placed or performed during the hospital encounter of 08/23/20   Culture, Urine    Specimen: Urine, clean catch   Result Value Ref Range    Specimen URINE CLEAN CATCH     Special Requests NONE     Culture Final Report     Culture ESCHERICHIA COLI >100,000 CFU/ml (A)        Susceptibility    Escherichia coli - BACTERIAL SUSCEPTIBILITY PANEL ISABEL     ampicillin <=8 Sensitive      ceFAZolin <=2 Sensitive      cefepime <=2 Sensitive      cefuroxime <=4 Sensitive      ciprofloxacin <=1 Sensitive      ertapenem <=0.5 Sensitive      gentamicin <=4 Sensitive      meropenem <=1 Sensitive      piperacillin-tazobactam <=16 Sensitive      trimethoprim-sulfamethoxazole <=2/38 Sensitive      nitrofurantoin <=32 Sensitive    Culture, Blood 2    Specimen: Blood gases   Result Value Ref Range    Specimen BLOOD     Special Requests NONE     Culture NO GROWTH AT 48 HOURS    Culture, Blood 1    Specimen: Blood gases   Result Value Ref Range    Specimen BLOOD     Special Requests NONE     Culture NO GROWTH AT 48 HOURS    CBC w/ auto diff   Result Value Ref Range    WBC 8.2 4.0 - 10.5 K/CU MM    RBC 4.48 4.2 - 5.4 M/CU MM    Hemoglobin 12.7 12.5 - 16.0 GM/DL    Hematocrit 39.7 37 - 47 %    MCV 88.6 78 - 100 FL    MCH 28.3 27 - 31 PG    MCHC 32.0 32.0 - 36.0 %    RDW 15.7 (H) 11.7 - 14.9 %    Platelets 855 660 - 435 K/CU MM    MPV 8.9 7.5 - 11.1 FL    Differential Type AUTOMATED DIFFERENTIAL     Segs Relative 71.0 (H) 36 - 66 %    Lymphocytes % 18.7 (L) 24 - 44 %    Monocytes % 8.8 (H) 0 - 4 %    Eosinophils % 0.2 0 - 3 %    Basophils % 0.6 0 - 1 %    Segs Absolute 5.8 K/CU MM    Lymphocytes Absolute 1.5 K/CU MM    Monocytes Absolute 0.7 K/CU MM    Eosinophils Absolute 0.0 K/CU MM    Basophils Absolute 0.1 K/CU MM    Nucleated RBC % 0.0 %    Total Nucleated RBC 0.0 K/CU MM    Total Immature Neutrophil 0.06 K/CU MM    Immature Neutrophil % 0.7 (H) 0 - 0.43 %   Lactic Acid, Plasma   Result Value Ref Range    Lactate 4.6 (HH) 0.4 - 2.0 mMOL/L   Urinalysis   Result Value Ref Range    Color, UA YELLOW YELLOW    Clarity, UA CLEAR CLEAR    Glucose, Urine NEGATIVE NEGATIVE MG/DL    Bilirubin Urine NEGATIVE NEGATIVE MG/DL    Ketones, Urine NEGATIVE NEGATIVE MG/DL    Specific Gravity, UA 1.006 1.001 - 1.035    Blood, Urine NEGATIVE NEGATIVE    pH, Urine 8.0 5.0 - 8.0    Protein, UA NEGATIVE NEGATIVE MG/DL    Urobilinogen, Urine NORMAL 0.2 - 1.0 MG/DL    Nitrite Urine, Quantitative NEGATIVE NEGATIVE    Leukocyte Esterase, Urine MODERATE (A) NEGATIVE RBC, UA <1 0 - 6 /HPF    WBC, UA 8 (H) 0 - 5 /HPF    Bacteria, UA NEGATIVE NEGATIVE /HPF    Squam Epithel, UA 1 /HPF    Trichomonas, UA NONE SEEN NONE SEEN /HPF    Amorphous, UA RARE /HPF   PT/INR   Result Value Ref Range    Protime 11.1 (L) 11.7 - 14.5 SECONDS    INR 0.92 INDEX   PTT   Result Value Ref Range    aPTT 39.0 (H) 25.1 - 37.1 SECONDS   Blood Gas, Venous   Result Value Ref Range    pH, Alvaro 7.46 (H) 7.32 - 7.42    pCO2, Alvaro 40 38 - 52 mmHG    pO2, Alvaro 51 (H) 28 - 48 mmHG    Base Exc, Mixed 4.2 (H) 0 - 2.3    HCO3, Venous 28.4 (H) 19 - 25 MMOL/L    O2 Sat, Alvaro 85.0 (H) 50 - 70 %    Comment VBG    SPECIMEN REJECTION   Result Value Ref Range    Rejected Test CMPR LIPA PBNP TROT     Reason for Rejection UNABLE TO PERFORM TESTING:    Comprehensive Metabolic Panel   Result Value Ref Range    Sodium 132 (L) 135 - 145 MMOL/L    Potassium 4.2 3.5 - 5.1 MMOL/L    Chloride 98 (L) 99 - 110 mMol/L    CO2 23 21 - 32 MMOL/L    BUN 21 6 - 23 MG/DL    CREATININE 0.8 0.6 - 1.1 MG/DL    Glucose 123 (H) 70 - 99 MG/DL    Calcium 9.2 8.3 - 10.6 MG/DL    Alb 3.0 (L) 3.4 - 5.0 GM/DL    Total Protein 6.2 (L) 6.4 - 8.2 GM/DL    Total Bilirubin 0.4 0.0 - 1.0 MG/DL    ALT 10 10 - 40 U/L    AST 15 15 - 37 IU/L    Alkaline Phosphatase 95 40 - 129 IU/L    GFR Non-African American >60 >60 mL/min/1.73m2    GFR African American >60 >60 mL/min/1.73m2    Anion Gap 11 4 - 16   Lipase   Result Value Ref Range    Lipase 23 13 - 60 IU/L   Brain Natriuretic Peptide   Result Value Ref Range    Pro-.9 (H) <300 PG/ML   Troponin   Result Value Ref Range    Troponin T <0.010 <0.01 NG/ML   CBC auto differential   Result Value Ref Range    WBC 9.4 4.0 - 10.5 K/CU MM    RBC 4.05 (L) 4.2 - 5.4 M/CU MM    Hemoglobin 11.3 (L) 12.5 - 16.0 GM/DL    Hematocrit 36.4 (L) 37 - 47 %    MCV 89.9 78 - 100 FL    MCH 27.9 27 - 31 PG    MCHC 31.0 (L) 32.0 - 36.0 %    RDW 15.5 (H) 11.7 - 14.9 %    Platelets 567 359 - 177 K/CU MM    MPV 9.3 7.5 - 11.1 FL Differential Type AUTOMATED DIFFERENTIAL     Segs Relative 77.3 (H) 36 - 66 %    Lymphocytes % 12.9 (L) 24 - 44 %    Monocytes % 8.6 (H) 0 - 4 %    Eosinophils % 0.3 0 - 3 %    Basophils % 0.4 0 - 1 %    Segs Absolute 7.2 K/CU MM    Lymphocytes Absolute 1.2 K/CU MM    Monocytes Absolute 0.8 K/CU MM    Eosinophils Absolute 0.0 K/CU MM    Basophils Absolute 0.0 K/CU MM    Nucleated RBC % 0.0 %    Total Nucleated RBC 0.0 K/CU MM    Total Immature Neutrophil 0.05 K/CU MM    Immature Neutrophil % 0.5 (H) 0 - 0.43 %   Comprehensive Metabolic Panel w/ Reflex to MG   Result Value Ref Range    Sodium 135 135 - 145 MMOL/L    Potassium 4.3 3.5 - 5.1 MMOL/L    Chloride 101 99 - 110 mMol/L    CO2 24 21 - 32 MMOL/L    BUN 19 6 - 23 MG/DL    CREATININE 0.8 0.6 - 1.1 MG/DL    Glucose 91 70 - 99 MG/DL    Calcium 8.7 8.3 - 10.6 MG/DL    Alb 3.4 3.4 - 5.0 GM/DL    Total Protein 5.8 (L) 6.4 - 8.2 GM/DL    Total Bilirubin 0.5 0.0 - 1.0 MG/DL    ALT 10 10 - 40 U/L    AST 14 (L) 15 - 37 IU/L    Alkaline Phosphatase 91 40 - 128 IU/L    GFR Non-African American >60 >60 mL/min/1.73m2    GFR African American >60 >60 mL/min/1.73m2    Anion Gap 10 4 - 16   Lactic acid, plasma   Result Value Ref Range    Lactate 1.1 0.4 - 2.0 mMOL/L   Troponin   Result Value Ref Range    Troponin T <0.010 <0.01 NG/ML   Troponin   Result Value Ref Range    Troponin T <0.010 <0.01 NG/ML   Lipid panel - fasting   Result Value Ref Range    Triglycerides 99 <150 MG/DL    Cholesterol 135 <200 MG/DL    HDL 52 >40 MG/DL    LDL Direct 73 <100 MG/DL   Procalcitonin   Result Value Ref Range    Procalcitonin 0.085    CBC   Result Value Ref Range    WBC 11.0 (H) 4.0 - 10.5 K/CU MM    RBC 4.28 4.2 - 5.4 M/CU MM    Hemoglobin 12.0 (L) 12.5 - 16.0 GM/DL    Hematocrit 38.6 37 - 47 %    MCV 90.2 78 - 100 FL    MCH 28.0 27 - 31 PG    MCHC 31.1 (L) 32.0 - 36.0 %    RDW 15.5 (H) 11.7 - 14.9 %    Platelets 519 226 - 543 K/CU MM    MPV 9.1 7.5 - 11.1 FL   Basic Metabolic Panel w/ Reflex to MG   Result Value Ref Range    Sodium 138 135 - 145 MMOL/L    Potassium 4.2 3.5 - 5.1 MMOL/L    Chloride 102 99 - 110 mMol/L    CO2 24 21 - 32 MMOL/L    Anion Gap 12 4 - 16    BUN 14 6 - 23 MG/DL    CREATININE 0.8 0.6 - 1.1 MG/DL    Glucose 110 (H) 70 - 99 MG/DL    Calcium 9.3 8.3 - 10.6 MG/DL    GFR Non-African American >60 >60 mL/min/1.73m2    GFR African American >60 >60 mL/min/1.73m2   POCT Glucose   Result Value Ref Range    POC Glucose 123 (H) 70 - 99 MG/DL   EKG 12 Lead   Result Value Ref Range    Ventricular Rate 78 BPM    Atrial Rate 77 BPM    QRS Duration 82 ms    Q-T Interval 364 ms    QTc Calculation (Bazett) 414 ms    R Axis -41 degrees    T Axis -13 degrees    Diagnosis       Atrial fibrillation  Left axis deviation  Minimal voltage criteria for LVH, may be normal variant  Nonspecific ST and T wave abnormality  Abnormal ECG  When compared with ECG of 02-AUG-2020 10:32,  Nonspecific T wave abnormality, worse in Anterior leads  Confirmed by Richelle Ho MD, Mitchell Vargas ([id number]) on 8/25/2020 8:05:54 PM             EKG    See attending note    RADIOLOGY    Xr Pelvis (1-2 Views)    Result Date: 8/2/2020  EXAMINATION: ONE XRAY VIEW OF THE PELVIS 8/2/2020 2:37 pm COMPARISON: None. HISTORY: ORDERING SYSTEM PROVIDED HISTORY: pain TECHNOLOGIST PROVIDED HISTORY: Reason for exam:->pain Reason for Exam: fall Acuity: Acute Type of Exam: Initial FINDINGS: Evidence of remote prior ORIF of the right hip. No acute fracture or dislocation. Pelvic ring is intact. Bilateral hips demonstrate mild osteoarthritis. Lower lumbar spine disc degenerative changes. Bilateral hip osteoarthritis. Evidence remote ORIF of the right femoral neck. No acute fracture or dislocation. Xr Knee Left (1-2 Views)    Result Date: 8/2/2020  EXAMINATION: TWO XRAY VIEWS OF THE LEFT KNEE 8/2/2020 9:55 am COMPARISON: None.  HISTORY: ORDERING SYSTEM PROVIDED HISTORY: pain TECHNOLOGIST PROVIDED HISTORY: Reason for exam:->pain Reason for Exam: pain Acuity: Acute Type of Exam: Initial Mechanism of Injury: Pt arrived via EMS from [facility] for unwitnessed fall. Pt has hematoma to left side of forehead, pt takes blood thinner, pt also has lac to left knee. Pt also has slurred speech FINDINGS: There is no joint effusion. There is mild tricompartmental osteoarthritis of the knee. No obvious acute osseous abnormality is identified by plain film imaging. If there is focal pain, CT should be considered as fractures can be missed on plain film imaging. An MRI could better evaluate the soft tissues if indicated. Mild tricompartmental osteoarthritis of the knee. No obvious acute osseous abnormality. If pain persists, repeat films could be performed in a week. Ct Head Wo Contrast    Result Date: 8/23/2020  EXAMINATION: CT OF THE HEAD WITHOUT CONTRAST  8/23/2020 7:33 pm TECHNIQUE: CT of the head was performed without the administration of intravenous contrast. Dose modulation, iterative reconstruction, and/or weight based adjustment of the mA/kV was utilized to reduce the radiation dose to as low as reasonably achievable. COMPARISON: 08/03/2020 HISTORY: ORDERING SYSTEM PROVIDED HISTORY: [facility] TECHNOLOGIST PROVIDED HISTORY: Has a \"code stroke\" or \"stroke alert\" been called? ->No Reason for exam:->[facility] Reason for Exam: AMS/ head injury/ hematoma Acuity: Acute Type of Exam: Initial Mechanism of Injury: pt had reported head injury 1 week ago / [facility] Relevant Medical/Surgical History: recent hx SAH FINDINGS: BRAIN/VENTRICLES: There is no acute intracranial hemorrhage, mass effect or midline shift. No abnormal extra-axial fluid collection. The gray-white differentiation is maintained without evidence of an acute infarct. There is no evidence of hydrocephalus. Periventricular and deep subcortical white matter hypoattenuation, consistent microangiopathic change. Diffuse parenchymal volume loss.   Encephalomalacia in the left frontal lobe is similar prior examination. Atherosclerosis of the intracranial vasculature. ORBITS: The visualized portion of the orbits demonstrate no acute abnormality. SINUSES: Mucosal thickening in the right maxillary sinus. SOFT TISSUES/SKULL:  Left frontal subgaleal hematoma. No calvarial fracture. No acute intracranial abnormality. Left frontal scalp hematoma without calvarial fracture. Ct Head Wo Contrast    Result Date: 8/3/2020  EXAMINATION: CT OF THE HEAD WITHOUT CONTRAST  8/3/2020 8:14 pm TECHNIQUE: CT of the head was performed without the administration of intravenous contrast. Dose modulation, iterative reconstruction, and/or weight based adjustment of the mA/kV was utilized to reduce the radiation dose to as low as reasonably achievable. COMPARISON: CT brain 08/02/2020. HISTORY: ORDERING SYSTEM PROVIDED HISTORY: head injury. Still very confused TECHNOLOGIST PROVIDED HISTORY: Reason for exam:->head injury. Still very confused Has a \"code stroke\" or \"stroke alert\" been called? ->No Reason for Exam: previous fall with recent fall. confusion and pain Acuity: Acute Type of Exam: Initial FINDINGS: BRAIN/VENTRICLES: There is an acute extra-axial hemorrhage lateral to the anterior left temporal lobe measuring up to 13 mm on axial image 20. There is also acute hemorrhage within the left perimesencephalic cistern with mass effect on the adjacent midbrain and lupis. Small amount of subarachnoid hemorrhage within the adjacent cerebellum. No hydrocephalus. Mild generalized parenchymal volume loss and chronic periventricular white matter hypoattenuation are seen. No evidence of acute territorial infarction. ORBITS: The visualized portion of the orbits demonstrate no acute abnormality. SINUSES: The visualized paranasal sinuses and mastoid air cells demonstrate no acute abnormality. SOFT TISSUES/SKULL:  Left scalp hematoma and edema.      1. Acute subarachnoid hemorrhage within the left perimesencephalic cistern and adjacent cerebellum with mass effect on the brainstem. 2. Small acute extra-axial hemorrhage adjacent to the left anterior temporal lobe. 3. Left scalp hematoma. No calvarial fracture. The findings were sent to the Radiology Results [address] at 8:31 pm on 8/3/2020to be communicated to a licensed caregiver. Ct Head Wo Contrast    Result Date: 8/2/2020  EXAMINATION: CT OF THE HEAD WITHOUT CONTRAST; CT OF THE CERVICAL SPINE WITHOUT CONTRAST 8/2/2020 10:14 am TECHNIQUE: CT of the head was performed without the administration of intravenous contrast. Dose modulation, iterative reconstruction, and/or weight based adjustment of the mA/kV was utilized to reduce the radiation dose to as low as reasonably achievable.; CT of the cervical spine was performed without the administration of intravenous contrast. Multiplanar reformatted images are provided for review. Dose modulation, iterative reconstruction, and/or weight based adjustment of the mA/kV was utilized to reduce the radiation dose to as low as reasonably achievable. COMPARISON: CT brain February 17, 2020. HISTORY: ORDERING SYSTEM PROVIDED HISTORY: fall TECHNOLOGIST PROVIDED HISTORY: Reason for exam:->fall Has a \"code stroke\" or \"stroke alert\" been called? ->No Reason for Exam: fall Acuity: Acute Type of Exam: Initial FINDINGS: CT brain: BRAIN/VENTRICLES: There is no acute intracranial hemorrhage, mass effect or midline shift. No abnormal extra-axial fluid collection. The gray-white differentiation is maintained without evidence of an acute infarct. There is no evidence of hydrocephalus. Cortical atrophy with moderate severe chronic microvascular ischemic changes grossly similar to the prior study. ORBITS: The visualized portion of the orbits demonstrate no acute abnormality. SINUSES: The visualized paranasal sinuses and mastoid air cells demonstrate no acute abnormality. SOFT TISSUES/SKULL:  Large left-sided scalp hematoma.      No acute intracranial abnormality. Large left-sided scalp hematoma. Ct Chest Wo Contrast    Result Date: 8/23/2020  EXAMINATION: CT OF THE CHEST WITHOUT CONTRAST 8/23/2020 7:33 pm TECHNIQUE: CT of the chest was performed without the administration of intravenous contrast. Multiplanar reformatted images are provided for review. Dose modulation, iterative reconstruction, and/or weight based adjustment of the mA/kV was utilized to reduce the radiation dose to as low as reasonably achievable. COMPARISON: Chest radiograph 08/23/2020. HISTORY: ORDERING SYSTEM PROVIDED HISTORY: old trauma, eval TECHNOLOGIST PROVIDED HISTORY: Reason for exam:->old trauma, eval Reason for Exam: old trauma, eval x1 week ago Acuity: Acute Type of Exam: Initial FINDINGS: Mediastinum: Lack of intravenous contrast limits evaluation of the mediastinum. The thoracic aorta is normal in caliber with mild calcific plaquing. Coronary artery atherosclerotic vascular calcifications are seen. The main pulmonary artery is normal in caliber. Mild cardiomegaly. No pericardial effusion. Small hiatal hernia. The mediastinal esophagus and thyroid gland are unremarkable. No lymphadenopathy. Lungs/pleura: The central airways are patent. No pleural effusion or pneumothorax. No consolidation or interlobular septal thickening. Mild diffuse subpleural reticulation. Upper Abdomen: Small layering gallstones. Limited images through the upper abdomen demonstrate no acute process. Soft Tissues/Bones: There is a moderate T4 vertebral body compression fracture. On the CT cervical spine from 08/02/2020 this was partially visualize but is thought to be worsened on this exam.  No significant retropulsion. 1. Mild bibasilar atelectasis. No consolidation. 2. Mild diffuse bilateral subpleural reticular opacities nonspecific but can be seen in the setting of early fibrosis. 3. Moderate T4 vertebral body compression fracture.   This was partially included in the field of view on the CT cervical spine from 08/02/2020 but is likely worse on this exam.  If clinically indicated consider MRI. Ct Cervical Spine Wo Contrast    Result Date: 8/2/2020  EXAMINATION: CT OF THE HEAD WITHOUT CONTRAST; CT OF THE CERVICAL SPINE WITHOUT CONTRAST 8/2/2020 10:14 am TECHNIQUE: CT of the head was performed without the administration of intravenous contrast. Dose modulation, iterative reconstruction, and/or weight based adjustment of the mA/kV was utilized to reduce the radiation dose to as low as reasonably achievable.; CT of the cervical spine was performed without the administration of intravenous contrast. Multiplanar reformatted images are provided for review. Dose modulation, iterative reconstruction, and/or weight based adjustment of the mA/kV was utilized to reduce the radiation dose to as low as reasonably achievable. COMPARISON: CT brain February 17, 2020. HISTORY: ORDERING SYSTEM PROVIDED HISTORY: fall TECHNOLOGIST PROVIDED HISTORY: Reason for exam:->fall Has a \"code stroke\" or \"stroke alert\" been called? ->No Reason for Exam: fall Acuity: Acute Type of Exam: Initial FINDINGS: CT brain: BRAIN/VENTRICLES: There is no acute intracranial hemorrhage, mass effect or midline shift. No abnormal extra-axial fluid collection. The gray-white differentiation is maintained without evidence of an acute infarct. There is no evidence of hydrocephalus. Cortical atrophy with moderate severe chronic microvascular ischemic changes grossly similar to the prior study. ORBITS: The visualized portion of the orbits demonstrate no acute abnormality. SINUSES: The visualized paranasal sinuses and mastoid air cells demonstrate no acute abnormality. SOFT TISSUES/SKULL:  Large left-sided scalp hematoma. No acute intracranial abnormality. Large left-sided scalp hematoma. Xr Chest Portable    Result Date: 8/23/2020  EXAMINATION: ONE XRAY VIEW OF THE CHEST 8/23/2020 5:56 pm COMPARISON: Chest radiograph 02/01/2019.  HISTORY: ORDERING SYSTEM PROVIDED HISTORY: AMS TECHNOLOGIST PROVIDED HISTORY: Reason for exam:->AMS Acuity: Acute Type of Exam: Initial FINDINGS: The cardiomediastinal silhouette is unchanged. Asymmetric elevation of the right hemidiaphragm. Mild left basilar opacities. No pneumothorax, vascular congestion, or pleural effusion. No acute osseous abnormality. Mild left basilar opacities compatible with atelectasis. In the appropriate clinical setting pneumonia is not excluded. Mri Brain Without Contrast    Result Date: 8/25/2020  EXAMINATION: MRI OF THE BRAIN WITHOUT CONTRAST  8/25/2020 3:03 pm TECHNIQUE: Multiplanar multisequence MRI of the brain was performed without the administration of intravenous contrast. COMPARISON: CT head 08/23/2020 and 08/03/2020 HISTORY: ORDERING SYSTEM PROVIDED HISTORY: Unresponsive episode/recent subarachnoid hemorrhage TECHNOLOGIST PROVIDED HISTORY: Reason for exam:->Unresponsive episode/recent subarachnoid hemorrhage Reason for Exam: Mariah Hays, Hx CVA FINDINGS: INTRACRANIAL STRUCTURES/VENTRICLES: There is no diffusion restriction to suggest acute infarct. Within the lateral left temporal lobe, there is redemonstration of a 1 cm focus of T1 and T2 hyperintense intraparenchymal hemorrhage. Trace left parieto-occipital subdural hemorrhage measures up to 3 mm in thickness without associated mass effect. Previously present subarachnoid hemorrhage in the left quadrigeminal plate cistern is no longer evident. There is diffuse parenchymal volume loss with moderate chronic white matter microvascular ischemic changes and a chronic lacunar infarct involving the left frontal subinsular white matter. There is no hydrocephalus. Midline is maintained. The basal cisterns are patent. Major intracranial vascular flow voids are present. ORBITS: Orbital structures are unremarkable. SINUSES: Paranasal sinuses are well aerated. Right mastoid effusion is present.   No left mastoid effusion is may contain errors related to that system including errors in grammar, punctuation, and spelling, as well as words and phrases that may be inappropriate. If there are any questions or concerns please feel free to contact the dictating provider for clarification.         Jocelin Sanchez PA-C  08/25/20 4514

## 2020-08-23 NOTE — ED NOTES
Bed: 04TR-04  Expected date:   Expected time:   Means of arrival:   Comments:  Medic - F from [facility] - found unresponsive at [phone]. Now responds to nursing staff but nonverbal. Recent head injury. - thinners. Was somewhat normal ? At dinner at 1615.       Laure Roger RN  08/23/20 0101

## 2020-08-23 NOTE — ED PROVIDER NOTES
[address]      Pt Name: Anial Carpio  MRN: [id number]  [place] 11/28/1932  Date of evaluation: 8/23/2020  Provider: Melanie Keene MD     ATTENDING NOTE    Anila Carpio is a 80 y.o. female who presents with a complaint of   Chief Complaint   Patient presents with    Altered Mental Status     EMS reports pt has been alert and responsive for them since their arrival to [facility].  Head Injury     pt had reported head injury 1 week ago per nursing home report     On my exam:  Vital Signs: /67   Pulse 66   Temp 97.9 °F (36.6 °C) (Oral)   Resp 16   Ht 5' 4\" (1.626 m)   Wt 133 lb 8 oz (60.6 kg)   SpO2 96%   BMI 22.92 kg/m²   General: elderly female who appears chronically ill with recent head trauma. HEENT: large healing hematoma to left upper forehead with surrounding ecchymoses. Lungs: decreased at bases. Heart:           CONSULTS:  PHARMACY TO DOSE VANCOMYCIN  IP CONSULT TO HOSPITALIST  IP CONSULT TO NEUROLOGY      81 yo female presents from nursing home with Altered Mental Status. Pt is a DNR CCA. She had a recent subarachnoid hemorrhage and has well healing facial contusions and hematoma. Pt is not a candidate for TPA and possibly at baseline. She did show signs of pneumonia and UTI as well as an elevated lactic acid. She was covered with emperic antibiotics and admitted for continued treatment and evaluation. Hospitalist was agreeable to admission. Patient was admitted in good condition with stable vitals. FINAL IMPRESSION      1. Altered mental status, unspecified altered mental status type    2. Lactic acid acidosis    3. Pneumonia due to organism          DISPOSITION/PLAN   DISPOSITION Admitted 08/23/2020 08:58:50 PM      PATIENT REFERRED TO:  Gerardo Trejo, [id number] [facility] [address]  [address] [place] [address]  [phone]            This patient was seen

## 2020-08-24 ENCOUNTER — APPOINTMENT (OUTPATIENT)
Dept: MRI IMAGING | Age: 85
DRG: 689 | End: 2020-08-24
Payer: MEDICARE

## 2020-08-24 LAB
ALBUMIN SERPL-MCNC: 3.4 GM/DL (ref 3.4–5)
ALP BLD-CCNC: 91 IU/L (ref 40–128)
ALT SERPL-CCNC: 10 U/L (ref 10–40)
ANION GAP SERPL CALCULATED.3IONS-SCNC: 10 MMOL/L (ref 4–16)
AST SERPL-CCNC: 14 IU/L (ref 15–37)
BASOPHILS ABSOLUTE: 0 K/CU MM
BASOPHILS RELATIVE PERCENT: 0.4 % (ref 0–1)
BILIRUB SERPL-MCNC: 0.5 MG/DL (ref 0–1)
BUN BLDV-MCNC: 19 MG/DL (ref 6–23)
CALCIUM SERPL-MCNC: 8.7 MG/DL (ref 8.3–10.6)
CHLORIDE BLD-SCNC: 101 MMOL/L (ref 99–110)
CHOLESTEROL: 135 MG/DL
CO2: 24 MMOL/L (ref 21–32)
CREAT SERPL-MCNC: 0.8 MG/DL (ref 0.6–1.1)
DIFFERENTIAL TYPE: ABNORMAL
EOSINOPHILS ABSOLUTE: 0 K/CU MM
EOSINOPHILS RELATIVE PERCENT: 0.3 % (ref 0–3)
GFR AFRICAN AMERICAN: >60 ML/MIN/1.73M2
GFR NON-AFRICAN AMERICAN: >60 ML/MIN/1.73M2
GLUCOSE BLD-MCNC: 91 MG/DL (ref 70–99)
HCT VFR BLD CALC: 36.4 % (ref 37–47)
HDLC SERPL-MCNC: 52 MG/DL
HEMOGLOBIN: 11.3 GM/DL (ref 12.5–16)
IMMATURE NEUTROPHIL %: 0.5 % (ref 0–0.43)
LACTATE: 1.1 MMOL/L (ref 0.4–2)
LDL CHOLESTEROL DIRECT: 73 MG/DL
LYMPHOCYTES ABSOLUTE: 1.2 K/CU MM
LYMPHOCYTES RELATIVE PERCENT: 12.9 % (ref 24–44)
MCH RBC QN AUTO: 27.9 PG (ref 27–31)
MCHC RBC AUTO-ENTMCNC: 31 % (ref 32–36)
MCV RBC AUTO: 89.9 FL (ref 78–100)
MONOCYTES ABSOLUTE: 0.8 K/CU MM
MONOCYTES RELATIVE PERCENT: 8.6 % (ref 0–4)
NUCLEATED RBC %: 0 %
PDW BLD-RTO: 15.5 % (ref 11.7–14.9)
PLATELET # BLD: 386 K/CU MM (ref 140–440)
PMV BLD AUTO: 9.3 FL (ref 7.5–11.1)
POTASSIUM SERPL-SCNC: 4.3 MMOL/L (ref 3.5–5.1)
PROCALCITONIN: 0.09
RBC # BLD: 4.05 M/CU MM (ref 4.2–5.4)
SEGMENTED NEUTROPHILS ABSOLUTE COUNT: 7.2 K/CU MM
SEGMENTED NEUTROPHILS RELATIVE PERCENT: 77.3 % (ref 36–66)
SODIUM BLD-SCNC: 135 MMOL/L (ref 135–145)
TOTAL IMMATURE NEUTOROPHIL: 0.05 K/CU MM
TOTAL NUCLEATED RBC: 0 K/CU MM
TOTAL PROTEIN: 5.8 GM/DL (ref 6.4–8.2)
TRIGL SERPL-MCNC: 99 MG/DL
TROPONIN T: <0.01 NG/ML
TROPONIN T: <0.01 NG/ML
WBC # BLD: 9.4 K/CU MM (ref 4–10.5)

## 2020-08-24 PROCEDURE — 94640 AIRWAY INHALATION TREATMENT: CPT

## 2020-08-24 PROCEDURE — 94761 N-INVAS EAR/PLS OXIMETRY MLT: CPT

## 2020-08-24 PROCEDURE — 6370000000 HC RX 637 (ALT 250 FOR IP): Performed by: NURSE PRACTITIONER

## 2020-08-24 PROCEDURE — 36415 COLL VENOUS BLD VENIPUNCTURE: CPT

## 2020-08-24 PROCEDURE — 83721 ASSAY OF BLOOD LIPOPROTEIN: CPT

## 2020-08-24 PROCEDURE — 85025 COMPLETE CBC W/AUTO DIFF WBC: CPT

## 2020-08-24 PROCEDURE — 2580000003 HC RX 258: Performed by: NURSE PRACTITIONER

## 2020-08-24 PROCEDURE — 83605 ASSAY OF LACTIC ACID: CPT

## 2020-08-24 PROCEDURE — 80061 LIPID PANEL: CPT

## 2020-08-24 PROCEDURE — U0002 COVID-19 LAB TEST NON-CDC: HCPCS

## 2020-08-24 PROCEDURE — 1200000000 HC SEMI PRIVATE

## 2020-08-24 PROCEDURE — 6360000002 HC RX W HCPCS: Performed by: NURSE PRACTITIONER

## 2020-08-24 PROCEDURE — 84145 PROCALCITONIN (PCT): CPT

## 2020-08-24 PROCEDURE — 84484 ASSAY OF TROPONIN QUANT: CPT

## 2020-08-24 PROCEDURE — 97163 PT EVAL HIGH COMPLEX 45 MIN: CPT

## 2020-08-24 PROCEDURE — 80053 COMPREHEN METABOLIC PANEL: CPT

## 2020-08-24 RX ORDER — LORAZEPAM 2 MG/ML
0.5 INJECTION INTRAMUSCULAR ONCE
Status: COMPLETED | OUTPATIENT
Start: 2020-08-24 | End: 2020-08-25

## 2020-08-24 RX ORDER — HALOPERIDOL 5 MG/ML
5 INJECTION INTRAMUSCULAR ONCE
Status: DISCONTINUED | OUTPATIENT
Start: 2020-08-24 | End: 2020-08-27 | Stop reason: HOSPADM

## 2020-08-24 RX ORDER — IPRATROPIUM BROMIDE AND ALBUTEROL SULFATE 2.5; .5 MG/3ML; MG/3ML
1 SOLUTION RESPIRATORY (INHALATION) EVERY 4 HOURS PRN
Status: DISCONTINUED | OUTPATIENT
Start: 2020-08-24 | End: 2020-08-27 | Stop reason: HOSPADM

## 2020-08-24 RX ADMIN — ASPIRIN 81 MG: 81 TABLET, COATED ORAL at 10:31

## 2020-08-24 RX ADMIN — SODIUM CHLORIDE, PRESERVATIVE FREE 10 ML: 5 INJECTION INTRAVENOUS at 00:26

## 2020-08-24 RX ADMIN — SERTRALINE HYDROCHLORIDE 25 MG: 50 TABLET ORAL at 00:26

## 2020-08-24 RX ADMIN — LEVOTHYROXINE SODIUM 50 MCG: 50 TABLET ORAL at 10:31

## 2020-08-24 RX ADMIN — SODIUM CHLORIDE: 900 INJECTION INTRAVENOUS at 00:25

## 2020-08-24 RX ADMIN — IPRATROPIUM BROMIDE AND ALBUTEROL SULFATE 1 AMPULE: .5; 3 SOLUTION RESPIRATORY (INHALATION) at 07:52

## 2020-08-24 RX ADMIN — CEFEPIME 2 G: 2 INJECTION, POWDER, FOR SOLUTION INTRAVENOUS at 06:53

## 2020-08-24 RX ADMIN — ROSUVASTATIN CALCIUM 40 MG: 20 TABLET, COATED ORAL at 00:27

## 2020-08-24 ASSESSMENT — PAIN SCALES - GENERAL
PAINLEVEL_OUTOF10: 0

## 2020-08-24 ASSESSMENT — PAIN SCALES - WONG BAKER: WONGBAKER_NUMERICALRESPONSE: 0

## 2020-08-24 NOTE — PROGRESS NOTES
[address] [facility]  consulted by Dr. Jeancarlos Brasher for monitoring and adjustment. Indication for treatment: Aspiration pneumonia  Goal trough: 15 mcg/mL     Pertinent Laboratory Values:   Temp Readings from Last 3 Encounters:   08/23/20 98.2 °F (36.8 °C) (Oral)   08/04/20 98 °F (36.7 °C) (Oral)   02/19/20 97.5 °F (36.4 °C) (Oral)     Recent Labs     08/23/20  1750 08/24/20  0010   WBC 8.2 9.4   LACTATE 4.6*  --      Recent Labs     08/23/20  1846   BUN 21   CREATININE 0.8     Estimated Creatinine Clearance: 43 mL/min (based on SCr of 0.8 mg/dL). No intake or output data in the 24 hours ending 08/24/20 0148    Pertinent Cultures:  Date    Source    Results  8/23   Urine    Pending  8/23   Blood    Pending    Vancomycin level:   TROUGH:  No results for input(s): VANCOTROUGH in the last 72 hours. RANDOM:  No results for input(s): VANCORANDOM in the last 72 hours. Assessment:  WBC and temperature: normal WBC, afebrile  SCr, BUN, and urine output: WNL  Day(s) of therapy:1  Vancomycin level: To be collected    Plan:  Start vancomycin 1000 mg IVPB q18h  Check trough prior to the 4th dose  Pharmacy will continue to monitor patient and adjust therapy as indicated     Robyn 8/26 @0030    Thank you for the consult.   Jung Chanel RPh  8/24/2020 1:48 AM

## 2020-08-24 NOTE — PROGRESS NOTES
Transport went to get pt for mri but pt refused- Nurse states to place on hold today and try pt tomorrow

## 2020-08-24 NOTE — DISCHARGE INSTR - COC
Continuity of Care Form    Patient Name: Sergio Estevez   :  1932  MRN:  [id number]    Admit date:  2020  Discharge date:  2020    Code Status Order: Full Code   Advance Directives:   [facility] Documentation     Date/Time Healthcare Directive Type of Healthcare Directive Copy in [facility] [address] Agent's Name Healthcare Agent's Phone Number    [phone]  Unknown, patient unable to respond due to medical condition -- -- -- -- --          Admitting Physician:  Abhay Nicolas MD  PCP: Daisy Palumbo DO    Discharging Nurse: Lexi Me[address] Unit/Room#: 1687/4670-W  Discharging Unit Phone Number: [phone]    Emergency Contact:   Extended Emergency Contact Information  Primary Emergency Contact: Eliezre Cast  Address: [id number] [place] [address] [address], [address] Phone: [phone]  Relation: Child  Secondary Emergency Contact: Guillermo Cast  Address: [address] [place] [address] Phone: [phone]  Relation: Child    Past Surgical History:  Past Surgical History:   Procedure Laterality Date    APPENDECTOMY      BACK SURGERY      HIP SURGERY Right     repair    HYSTERECTOMY         Immunization History:   Immunization History   Administered Date(s) Administered    Tdap (Boostrix, Adacel) 2020       Active Problems:  Patient Active Problem List   Diagnosis Code    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    Transaminitis R74.0    Abdominal pain, epigastric R10.13    Non-intractable vomiting with nausea R11.2    Elevated liver enzymes R74.8    Concussion syndrome F07.81    Confusion R41.0    Encephalopathy acute G93.40       Isolation/Infection:   Isolation          No Isolation        Patient Infection Status     Infection Onset Added Last Indicated Last Indicated By Review Planned Expiration Resolved Resolved By    COVID-19 Rule Out 08/24/20 08/24/20 08/24/20 COVID-19 (Ordered) 08/31/20 09/07/20      Resolved    COVID-19 Rule Out 08/03/20 08/03/20 08/03/20 Covid-19 Ambulatory (Ordered)   08/04/20 Rule-Out Test Resulted          Nurse Assessment:  Last Vital Signs: /85   Pulse 82   Temp 97.9 °F (36.6 °C) (Oral)   Resp 17   Ht 5' 4\" (1.626 m)   Wt 139 lb 7 oz (63.2 kg)   SpO2 99%   BMI 23.93 kg/m²     Last documented pain score (0-10 scale): Pain Level: 0  Last Weight:   Wt Readings from Last 1 Encounters:   08/24/20 139 lb 7 oz (63.2 kg)     Mental Status:  disoriented and alert    IV Access:  - None    Nursing Mobility/ADLs:  Walking   Assisted  Transfer  Assisted  Bathing  Assisted  Dressing  Assisted  Toileting  Dependent  Feeding  Assisted  Med Admin  Dependent  Med Delivery   whole    Wound Care Documentation and Therapy:        Elimination:  Continence:   · Bowel: No  · Bladder: Yes and No  Urinary Catheter: None    Colostomy/Ileostomy/Ileal Conduit: No        Date of Last BM: 08/27/2020    Intake/Output Summary (Last 24 hours) at 8/24/2020 1214  Last data filed at 8/24/2020 0836  Gross per 24 hour   Intake --   Output 0 ml   Net 0 ml     No intake/output data recorded. Safety Concerns:     History of Falls (last 30 days) and At Risk for Falls    Impairments/Disabilities:      None      Patient's personal belongings (please select all that are sent with patient):  None    RN SIGNATURE:  Electronically signed by Lara Tucker LPN on 7/59/45 at 37:90 PM EDT      PHYSICIAN SECTION    Prognosis: Good    Condition at Discharge: Stable    Rehab Potential (if transferring to Rehab): Good      Physician Certification: I certify the above information and transfer of Jessica Mcclain  is necessary for the continuing treatment of the diagnosis listed and that she requires [facility] for less 30 days.      Update Admission H&P: No change in H&P     Nutrition Therapy:  Current Nutrition Therapy:   - Oral Diet: Dysphagia 3 advanced   Also continue tube feeding Jevity 50 cc/hr 6 PM to 6 AM    Routes of Feeding: Oral  Liquids: No Restrictions  Daily Fluid Restriction: no  Last Modified Barium Swallow with Video (Video Swallowing Test): not done    Treatments at the Time of Hospital Discharge:   Respiratory Treatments: None  Oxygen Therapy:  is not on home oxygen therapy.     Rehab Therapies: Physical Therapy and Occupational Therapy  Weight Bearing Status/Restrictions: No weight bearing restirctions    PHYSICIAN SIGNATURE:  Electronically signed by Arash Quevedo MD on 8/27/20 at 11:41 AM EDT

## 2020-08-24 NOTE — PROGRESS NOTES
Physical Therapy  [facility] PHYSICAL THERAPY EVALUATION  Sergio Estevez, 11/28/1932, 3027/3027-A, 8/24/2020    History  [place]:  The primary encounter diagnosis was Altered mental status, unspecified altered mental status type. Diagnoses of Lactic acid acidosis and Pneumonia due to organism were also pertinent to this visit. Patient  has a past medical history of Atrial fibrillation ([address] Cerebral artery occlusion with cerebral infarction ([address] CVD (cardiovascular disease), Dysphagia, GERD (gastroesophageal reflux disease), Hypertension, Muscle weakness, and Thyroid disease. Patient  has a past surgical history that includes hip surgery (Right); Hysterectomy; Appendectomy; and back surgery. Subjective:  Patient states: \"Hi\"   Pain:  Unable to rate or express any discomfort   Communication with other providers:  RN  Restrictions: general precautions, falls     Home Setup/Prior level of function  Unable to gather social functional history with poor communication and pt being a questionable historian. Per charting, pt lives in [facility] but was admitted from the skilled rehab side. Had a recent hospital admission 8/2-8/4 from a fall sustaining a SAH. Examination of body systems (includes body structures/functions, activity/participation limitations):  · Observation:  Supine in bed upon arrival. Initially pleasant and cooperative but once mobility became, pt became resistant and agitated  · Vision:  Appeared intact  · Hearing:  [facility]   · Cardiopulmonary:  Vitals stable on room air   · Cognition: Poor communication/expression, dec STM, dec command following. Unable to answer orientation questions. Dec insight/judgement. See OT/SLP note for further evaluation. Musculoskeletal  · ROM R/L:  WFL BLEs observed in function. Unable/unwilling to follow commands to formally assess  · Strength R/L:  Observed pt lifting LEs partially against gravity from supine position.  Overall appeared weak in function and endurance. Unable/unwilling to follow commands to formally assess  · Neuro:  Hx of SAH and CVA     Mobility/treatment:   · Rolling L/R:  NT   · Supine to sit (partially): modA at trunk/right LE  · Sit to supine: SBA  · Transfers: NT   · Sitting balance: Tolerated ~5 seconds partially upright/leaning on raised HOB surface with UE support on bed rail SBA   · Standing balance:  NT   · Gait: NT  · Educated pt on POC, role of PT. [facility] 6 Clicks Inpatient Mobility:  AM-PAC Inpatient Mobility Raw Score : 12    Safety: patient left in bed with alarm, call light within reach    Assessment:  Decreased functional mobility ; Decreased safe awareness; Decreased balance; Decreased ADL status; Decreased cognition; Decreased endurance; Decreased strength  Pt is an 80year old female admitted after being found unresponsive at the SNF a diagnosed with acute encephalopathy. Hx of recent head injury and SAH. Also has a past hx of a CVA. Unclear what pts baseline function is at this time with poor communication skills and pt being a questionable historian. Overall, eval was very limited today with dec pt participation and cooperation. Anticipate pt will need to return to subacute rehab prior to returning to her [facility] once medically stable. She presents with the above deficits and would benefit from continued therapy to address her current deficits, dec potential fall risk, and restore function.    Complexity: High   Prognosis: Fair   Plan Times per week: 2+ (trial based on pts cooperation)/week   Discharge Recommendations: Subacute/Skilled Nursing Facility  Equipment: continue to assess     Goals:  Short term goals  Time Frame for Short term goals: 1 week  Short term goal 1: Pt will perform sit><supine CGA  Short term goal 2: Pt will statically sit EOB x 2 minutes SBA  Short term goal 3: Pt will perform light dynamic sitting activity without UE support x 2 minutes CGA  Short term goal 4: Pt will transfer to

## 2020-08-24 NOTE — H&P
lymphadenopathy and no hepatosplenomegaly. No petechiae or ecchymoses. MS -No gross joint deformities. Left arm immobilized  SKIN -Normal coloration, warm, dry. Left-sided facial ecchymosis. Significant cephalohematoma left temporal aspect. NEURO-Cranial nerves appear grossly intact, nonsensical speech, no lateralizing weakness. PSYC-Awake, alert, oriented x 0- person, place, time, situation, anxious affect. Past Medical History:      Past Medical History:   Diagnosis Date    Atrial fibrillation ([address]     Cerebral artery occlusion with cerebral infarction ([address]     CVD (cardiovascular disease)     Dysphagia     GERD (gastroesophageal reflux disease)     Hypertension     Muscle weakness     Thyroid disease     hypo       Past Surgical  History:    has a past surgical history that includes hip surgery (Right); Hysterectomy; Appendectomy; and back surgery.     Social History:    FAM HX: STEPHY      Soc HX:   Social History     Socioeconomic History    Marital status:      Spouse name: None    Number of children: None    Years of education: None    Highest education level: None   Occupational History    None   Social Needs    Financial resource strain: None    Food insecurity     Worry: None     Inability: None    Transportation needs     Medical: None     Non-medical: None   Tobacco Use    Smoking status: Never Smoker    Smokeless tobacco: Never Used   Substance and Sexual Activity    Alcohol use: No    Drug use: No    Sexual activity: None   Lifestyle    Physical activity     Days per week: None     Minutes per session: None    Stress: None   Relationships    Social connections     Talks on phone: None     Gets together: None     Attends Mosque service: None     Active member of club or organization: None     Attends meetings of clubs or organizations: None     Relationship status: None    Intimate partner violence     Fear of current or ex partner: None     Emotionally abused: Infusions:   PRN Meds:     Data:     Laboratory this visit:  Reviewed  Recent Labs     08/23/20  1750   WBC 8.2   HGB 12.7   HCT 39.7         Recent Labs     08/23/20  1846   *   K 4.2   CL 98*   CO2 23   BUN 21   CREATININE 0.8     Recent Labs     08/23/20  1846   AST 15   ALT 10   BILITOT 0.4   ALKPHOS 95     Recent Labs     08/23/20  1750   INR 0.92         Radiology this visit:  Reviewed. Xr Pelvis (1-2 Views)    Result Date: 8/2/2020  EXAMINATION: ONE XRAY VIEW OF THE PELVIS 8/2/2020 2:37 pm COMPARISON: None. HISTORY: ORDERING SYSTEM PROVIDED HISTORY: pain TECHNOLOGIST PROVIDED HISTORY: Reason for exam:->pain Reason for Exam: fall Acuity: Acute Type of Exam: Initial FINDINGS: Evidence of remote prior ORIF of the right hip. No acute fracture or dislocation. Pelvic ring is intact. Bilateral hips demonstrate mild osteoarthritis. Lower lumbar spine disc degenerative changes. Bilateral hip osteoarthritis. Evidence remote ORIF of the right femoral neck. No acute fracture or dislocation. Xr Knee Left (1-2 Views)    Result Date: 8/2/2020  EXAMINATION: TWO XRAY VIEWS OF THE LEFT KNEE 8/2/2020 9:55 am COMPARISON: None. HISTORY: ORDERING SYSTEM PROVIDED HISTORY: pain TECHNOLOGIST PROVIDED HISTORY: Reason for exam:->pain Reason for Exam: pain Acuity: Acute Type of Exam: Initial Mechanism of Injury: Pt arrived via EMS from [facility] for unwitnessed fall. Pt has hematoma to left side of forehead, pt takes blood thinner, pt also has lac to left knee. Pt also has slurred speech FINDINGS: There is no joint effusion. There is mild tricompartmental osteoarthritis of the knee. No obvious acute osseous abnormality is identified by plain film imaging. If there is focal pain, CT should be considered as fractures can be missed on plain film imaging. An MRI could better evaluate the soft tissues if indicated. Mild tricompartmental osteoarthritis of the knee.   No obvious acute osseous abnormality. If pain persists, repeat films could be performed in a week. Ct Head Wo Contrast    Result Date: 8/23/2020  EXAMINATION: CT OF THE HEAD WITHOUT CONTRAST  8/23/2020 7:33 pm TECHNIQUE: CT of the head was performed without the administration of intravenous contrast. Dose modulation, iterative reconstruction, and/or weight based adjustment of the mA/kV was utilized to reduce the radiation dose to as low as reasonably achievable. COMPARISON: 08/03/2020 HISTORY: ORDERING SYSTEM PROVIDED HISTORY: [facility] TECHNOLOGIST PROVIDED HISTORY: Has a \"code stroke\" or \"stroke alert\" been called? ->No Reason for exam:->[facility] Reason for Exam: AMS/ head injury/ hematoma Acuity: Acute Type of Exam: Initial Mechanism of Injury: pt had reported head injury 1 week ago / [facility] Relevant Medical/Surgical History: recent hx SAH FINDINGS: BRAIN/VENTRICLES: There is no acute intracranial hemorrhage, mass effect or midline shift. No abnormal extra-axial fluid collection. The gray-white differentiation is maintained without evidence of an acute infarct. There is no evidence of hydrocephalus. Periventricular and deep subcortical white matter hypoattenuation, consistent microangiopathic change. Diffuse parenchymal volume loss. Encephalomalacia in the left frontal lobe is similar prior examination. Atherosclerosis of the intracranial vasculature. ORBITS: The visualized portion of the orbits demonstrate no acute abnormality. SINUSES: Mucosal thickening in the right maxillary sinus. SOFT TISSUES/SKULL:  Left frontal subgaleal hematoma. No calvarial fracture. No acute intracranial abnormality. Left frontal scalp hematoma without calvarial fracture.      Ct Head Wo Contrast    Result Date: 8/3/2020  EXAMINATION: CT OF THE HEAD WITHOUT CONTRAST  8/3/2020 8:14 pm TECHNIQUE: CT of the head was performed without the administration of intravenous contrast. Dose modulation, iterative reconstruction, and/or weight based adjustment of the mA/kV was utilized to reduce the radiation dose to as low as reasonably achievable. COMPARISON: CT brain 08/02/2020. HISTORY: ORDERING SYSTEM PROVIDED HISTORY: head injury. Still very confused TECHNOLOGIST PROVIDED HISTORY: Reason for exam:->head injury. Still very confused Has a \"code stroke\" or \"stroke alert\" been called? ->No Reason for Exam: previous fall with recent fall. confusion and pain Acuity: Acute Type of Exam: Initial FINDINGS: BRAIN/VENTRICLES: There is an acute extra-axial hemorrhage lateral to the anterior left temporal lobe measuring up to 13 mm on axial image 20. There is also acute hemorrhage within the left perimesencephalic cistern with mass effect on the adjacent midbrain and lupis. Small amount of subarachnoid hemorrhage within the adjacent cerebellum. No hydrocephalus. Mild generalized parenchymal volume loss and chronic periventricular white matter hypoattenuation are seen. No evidence of acute territorial infarction. ORBITS: The visualized portion of the orbits demonstrate no acute abnormality. SINUSES: The visualized paranasal sinuses and mastoid air cells demonstrate no acute abnormality. SOFT TISSUES/SKULL:  Left scalp hematoma and edema. 1. Acute subarachnoid hemorrhage within the left perimesencephalic cistern and adjacent cerebellum with mass effect on the brainstem. 2. Small acute extra-axial hemorrhage adjacent to the left anterior temporal lobe. 3. Left scalp hematoma. No calvarial fracture. The findings were sent to the Radiology Results [address] at 8:31 pm on 8/3/2020to be communicated to a licensed caregiver.      Ct Head Wo Contrast    Result Date: 8/2/2020  EXAMINATION: CT OF THE HEAD WITHOUT CONTRAST; CT OF THE CERVICAL SPINE WITHOUT CONTRAST 8/2/2020 10:14 am TECHNIQUE: CT of the head was performed without the administration of intravenous contrast. Dose modulation, iterative reconstruction, and/or weight based adjustment of the mA/kV was old trauma, eval x1 week ago Acuity: Acute Type of Exam: Initial FINDINGS: Mediastinum: Lack of intravenous contrast limits evaluation of the mediastinum. The thoracic aorta is normal in caliber with mild calcific plaquing. Coronary artery atherosclerotic vascular calcifications are seen. The main pulmonary artery is normal in caliber. Mild cardiomegaly. No pericardial effusion. Small hiatal hernia. The mediastinal esophagus and thyroid gland are unremarkable. No lymphadenopathy. Lungs/pleura: The central airways are patent. No pleural effusion or pneumothorax. No consolidation or interlobular septal thickening. Mild diffuse subpleural reticulation. Upper Abdomen: Small layering gallstones. Limited images through the upper abdomen demonstrate no acute process. Soft Tissues/Bones: There is a moderate T4 vertebral body compression fracture. On the CT cervical spine from 08/02/2020 this was partially visualize but is thought to be worsened on this exam.  No significant retropulsion. 1. Mild bibasilar atelectasis. No consolidation. 2. Mild diffuse bilateral subpleural reticular opacities nonspecific but can be seen in the setting of early fibrosis. 3. Moderate T4 vertebral body compression fracture. This was partially included in the field of view on the CT cervical spine from 08/02/2020 but is likely worse on this exam.  If clinically indicated consider MRI.      Ct Cervical Spine Wo Contrast    Result Date: 8/2/2020  EXAMINATION: CT OF THE HEAD WITHOUT CONTRAST; CT OF THE CERVICAL SPINE WITHOUT CONTRAST 8/2/2020 10:14 am TECHNIQUE: CT of the head was performed without the administration of intravenous contrast. Dose modulation, iterative reconstruction, and/or weight based adjustment of the mA/kV was utilized to reduce the radiation dose to as low as reasonably achievable.; CT of the cervical spine was performed without the administration of intravenous contrast. Multiplanar reformatted images are provided for review. Dose modulation, iterative reconstruction, and/or weight based adjustment of the mA/kV was utilized to reduce the radiation dose to as low as reasonably achievable. COMPARISON: CT brain February 17, 2020. HISTORY: ORDERING SYSTEM PROVIDED HISTORY: fall TECHNOLOGIST PROVIDED HISTORY: Reason for exam:->fall Has a \"code stroke\" or \"stroke alert\" been called? ->No Reason for Exam: fall Acuity: Acute Type of Exam: Initial FINDINGS: CT brain: BRAIN/VENTRICLES: There is no acute intracranial hemorrhage, mass effect or midline shift. No abnormal extra-axial fluid collection. The gray-white differentiation is maintained without evidence of an acute infarct. There is no evidence of hydrocephalus. Cortical atrophy with moderate severe chronic microvascular ischemic changes grossly similar to the prior study. ORBITS: The visualized portion of the orbits demonstrate no acute abnormality. SINUSES: The visualized paranasal sinuses and mastoid air cells demonstrate no acute abnormality. SOFT TISSUES/SKULL:  Large left-sided scalp hematoma. No acute intracranial abnormality. Large left-sided scalp hematoma. Xr Chest Portable    Result Date: 8/23/2020  EXAMINATION: ONE XRAY VIEW OF THE CHEST 8/23/2020 5:56 pm COMPARISON: Chest radiograph 02/01/2019. HISTORY: ORDERING SYSTEM PROVIDED HISTORY: AMS TECHNOLOGIST PROVIDED HISTORY: Reason for exam:->AMS Acuity: Acute Type of Exam: Initial FINDINGS: The cardiomediastinal silhouette is unchanged. Asymmetric elevation of the right hemidiaphragm. Mild left basilar opacities. No pneumothorax, vascular congestion, or pleural effusion. No acute osseous abnormality. Mild left basilar opacities compatible with atelectasis. In the appropriate clinical setting pneumonia is not excluded.        EKG this visit:  Reviewed         Electronically signed by GABRIELA Baekr CNP on 8/23/2020 at 8:50 PM

## 2020-08-24 NOTE — CARE COORDINATION
LSW called Alem with [facility] to verify the pt is in their skilled until. Eitan Ac stated pt is from their AL however pt came to the hospital from their skilled unit. Pt can return to the skilled unit once pt is stable and we have a negative COVID test.  LSW PS Dr. Ke Stoddard and requested a COVID test to be ordered. LSW spoke with pt RN who stated pt is confused at times. RN stated she spoke with pt's son Flor Che. LSW called Flor Che and he stated that he and his brother on on the [place]. It does not matter who we call first.  Flor Che stated that the plan is for pt to return to [facility] skilled once pt is stable. Pt needs a COVID negative before pt can return to the NH. CM will need a MARSHA completed by RN and doctor at discharge. If pt is discharged after hours please complete the following. ... Call report to [phone]   Fax completed AVS with both MARSHA on the AVS and any written Rx [phone]. Set up transportation (pt's choice) [facility] [phone] or [facility] [phone] and call family.

## 2020-08-24 NOTE — PROGRESS NOTES
Or  ondansetron, 4 mg, Q6H PRN      Subjective:   States left side of head hurts    Objective: Intake/Output Summary (Last 24 hours) at 8/24/2020 0949  Last data filed at 8/24/2020 0836  Gross per 24 hour   Intake --   Output 0 ml   Net 0 ml      Vitals:   Vitals:    08/24/20 0836   BP:    Pulse: 82   Resp: 17   Temp:    SpO2: 99%     Physical Exam:   Gen:  awake, alert, no apparent distress  Head/Eyes:  Normocephalic atraumatic, EOMI   NECK:   symmetrical, trachea midline  LUNGS: Normal Effort   CARDIOVASCULAR:  Normal rate  ABDOMEN:  non distended  MUSCULOSKELETAL:  ROM WNL  NEUROLOGIC: Alert and Oriented,  Cranial nerves II-XII are grossly intact.    SKIN:  Left frontal bruise noted +hematoma noted      Data:       CBC   Recent Labs     08/23/20  1750 08/24/20  0010   WBC 8.2 9.4   HGB 12.7 11.3*   HCT 39.7 36.4*    386      BMP   Recent Labs     08/23/20  1846 08/24/20  0010   * 135   K 4.2 4.3   CL 98* 101   CO2 23 24   BUN 21 19   CREATININE 0.8 0.8         Electronically signed by Nadia Awan MD on 8/24/2020 at 9:49 AM

## 2020-08-25 ENCOUNTER — APPOINTMENT (OUTPATIENT)
Dept: MRI IMAGING | Age: 85
DRG: 689 | End: 2020-08-25
Payer: MEDICARE

## 2020-08-25 PROBLEM — N39.0 UTI (URINARY TRACT INFECTION): Status: ACTIVE | Noted: 2020-08-25

## 2020-08-25 LAB
ANION GAP SERPL CALCULATED.3IONS-SCNC: 12 MMOL/L (ref 4–16)
BUN BLDV-MCNC: 14 MG/DL (ref 6–23)
CALCIUM SERPL-MCNC: 9.3 MG/DL (ref 8.3–10.6)
CHLORIDE BLD-SCNC: 102 MMOL/L (ref 99–110)
CO2: 24 MMOL/L (ref 21–32)
CREAT SERPL-MCNC: 0.8 MG/DL (ref 0.6–1.1)
CULTURE: ABNORMAL
CULTURE: ABNORMAL
GFR AFRICAN AMERICAN: >60 ML/MIN/1.73M2
GFR NON-AFRICAN AMERICAN: >60 ML/MIN/1.73M2
GLUCOSE BLD-MCNC: 110 MG/DL (ref 70–99)
HCT VFR BLD CALC: 38.6 % (ref 37–47)
HEMOGLOBIN: 12 GM/DL (ref 12.5–16)
Lab: ABNORMAL
MCH RBC QN AUTO: 28 PG (ref 27–31)
MCHC RBC AUTO-ENTMCNC: 31.1 % (ref 32–36)
MCV RBC AUTO: 90.2 FL (ref 78–100)
PDW BLD-RTO: 15.5 % (ref 11.7–14.9)
PLATELET # BLD: 367 K/CU MM (ref 140–440)
PMV BLD AUTO: 9.1 FL (ref 7.5–11.1)
POTASSIUM SERPL-SCNC: 4.2 MMOL/L (ref 3.5–5.1)
RBC # BLD: 4.28 M/CU MM (ref 4.2–5.4)
SODIUM BLD-SCNC: 138 MMOL/L (ref 135–145)
SPECIMEN: ABNORMAL
WBC # BLD: 11 K/CU MM (ref 4–10.5)

## 2020-08-25 PROCEDURE — 6360000002 HC RX W HCPCS: Performed by: FAMILY MEDICINE

## 2020-08-25 PROCEDURE — 80048 BASIC METABOLIC PNL TOTAL CA: CPT

## 2020-08-25 PROCEDURE — 70551 MRI BRAIN STEM W/O DYE: CPT

## 2020-08-25 PROCEDURE — 92610 EVALUATE SWALLOWING FUNCTION: CPT

## 2020-08-25 PROCEDURE — 36415 COLL VENOUS BLD VENIPUNCTURE: CPT

## 2020-08-25 PROCEDURE — 2580000003 HC RX 258: Performed by: INTERNAL MEDICINE

## 2020-08-25 PROCEDURE — 94761 N-INVAS EAR/PLS OXIMETRY MLT: CPT

## 2020-08-25 PROCEDURE — 6370000000 HC RX 637 (ALT 250 FOR IP): Performed by: NURSE PRACTITIONER

## 2020-08-25 PROCEDURE — 93010 ELECTROCARDIOGRAM REPORT: CPT | Performed by: INTERNAL MEDICINE

## 2020-08-25 PROCEDURE — 1200000000 HC SEMI PRIVATE

## 2020-08-25 PROCEDURE — 97167 OT EVAL HIGH COMPLEX 60 MIN: CPT

## 2020-08-25 PROCEDURE — 2580000003 HC RX 258: Performed by: NURSE PRACTITIONER

## 2020-08-25 PROCEDURE — 6360000002 HC RX W HCPCS: Performed by: INTERNAL MEDICINE

## 2020-08-25 PROCEDURE — 85027 COMPLETE CBC AUTOMATED: CPT

## 2020-08-25 RX ADMIN — CEFEPIME 2 G: 2 INJECTION, POWDER, FOR SOLUTION INTRAVENOUS at 12:06

## 2020-08-25 RX ADMIN — SODIUM CHLORIDE, PRESERVATIVE FREE 10 ML: 5 INJECTION INTRAVENOUS at 12:07

## 2020-08-25 RX ADMIN — LORAZEPAM 0.5 MG: 2 INJECTION INTRAMUSCULAR; INTRAVENOUS at 14:29

## 2020-08-25 RX ADMIN — ROSUVASTATIN CALCIUM 40 MG: 20 TABLET, COATED ORAL at 20:44

## 2020-08-25 RX ADMIN — SERTRALINE HYDROCHLORIDE 25 MG: 50 TABLET ORAL at 20:44

## 2020-08-25 RX ADMIN — CEFEPIME 2 G: 2 INJECTION, POWDER, FOR SOLUTION INTRAVENOUS at 23:03

## 2020-08-25 RX ADMIN — SODIUM CHLORIDE, PRESERVATIVE FREE 10 ML: 5 INJECTION INTRAVENOUS at 20:45

## 2020-08-25 ASSESSMENT — PAIN SCALES - WONG BAKER
WONGBAKER_NUMERICALRESPONSE: 0

## 2020-08-25 ASSESSMENT — PAIN SCALES - GENERAL
PAINLEVEL_OUTOF10: 0

## 2020-08-25 NOTE — PROGRESS NOTES
Speech Language Pathology  Facility/Department: [address] [place] [address]   CLINICAL BEDSIDE SWALLOW EVALUATION    NAME: Salvatore Collazo  : 1932  MRN: [id number]    IMPRESSIONS: Salvatore Collazo was referred for a bedside swallow evaluation after being admitted to [facility] with AMS, recent head injury with SAH. Medical hx includes a-fib, HTN, hypothyroidism, dementia, CVA, GERD, dysphagia. She was recently seen by SLP 8/3 and recommended dysphagia III diet/nectar thick liquids. Pt seen for evaluation seated upright in bed, alert, confused, requiring encouragement for participation. She followed limited commands and majority of spontaneous verbalizations were jargon. She did not follow directions to complete oral mechanism examination. She was presented with PO trials of thin liquids via straw, puree, soft solids, and regular solids. Oral stage mild-moderately impaired with disorganized and perseverative vertical \"munching\" mastication, reduced bolus formation, adequate AP transit/eventual clearance. Pharyngeal stage appears grossly intact/age-appropriate without s/s aspiration. Recommend dysphagia III diet/thin liquids, general aspiration precautions, assistance with meals. SLP will follow. ADMISSION DATE: 2020  ADMITTING DIAGNOSIS: has Acute acalculous cholecystitis; Transaminitis; Abdominal pain, epigastric; Non-intractable vomiting with nausea; Elevated liver enzymes;  Concussion syndrome; Confusion; Encephalopathy acute; and UTI (urinary tract infection) on their problem list.  ONSET DATE: this admission    Recent Chest Xray/CT of Chest: see chart    Date of Eval: 2020  Evaluating Therapist: Bouchra Mullen    Current Diet level:  Current Diet : Regular  Current Liquid Diet : Thin      Primary Complaint  Patient Complaint: unable to state d/t aphasia    Pain:  Pain Assessment  Pain Assessment: Faces  Pain Level: 0  Muller-Baker Pain Rating: No hurt  Patient's Stated Pain Goal: No pain    Reason for Referral  Jessica Mcclain was referred for a bedside swallow evaluation to assess the efficiency of her swallow function, identify signs and symptoms of aspiration and make recommendations regarding safe dietary consistencies, effective compensatory strategies, and safe eating environment. Impression  Dysphagia Diagnosis: Mild to moderate oral stage dysphagia  Dysphagia Outcome Severity Scale: Level 5: Mild dysphagia- Distant supervision. May need one diet consistency restricted     Treatment Plan  Requires SLP Intervention: Yes  Duration/Frequency of Treatment: 1-2x/week for LOS          Recommended Diet and Intervention  Diet Solids Recommendation: Dysphagia Soft and Bite-Sized (Dysphagia III)  Liquid Consistency Recommendation: Thin  Recommended Form of Meds: PO     Therapeutic Interventions: Patient/Family education;Diet tolerance monitoring    Compensatory Swallowing Strategies  Compensatory Swallowing Strategies: Upright as possible for all oral intake    Treatment/Goals  Short-term Goals  Timeframe for Short-term Goals: length of admission  Goal 1: Pt will tolerate dysphagia III diet/thin liquids with adequate oral manipulation and no s/s aspiration. Goal 2: Pt/caregivers will indicate understanding of all recommendations. General  Chart Reviewed: Yes  Behavior/Cognition: Alert;Confused; Requires cueing  Communication Observation: Aphasia  Dentition: Adequate  Patient Positioning: Upright in bed  Prior Dysphagia History: yes  Consistencies Administered: Reg solid; Dysphagia Minced and Moist (Dysphagia II); Dysphagia Pureed (Dysphagia I); Thin - straw           Vision/Hearing  Hearing  Hearing: Within functional limits    Oral Motor Deficits  Oral/Motor  Oral Motor: Exceptions to [facility]    Oral Phase Dysfunction  Oral Phase  Oral Phase: Exceptions     Indicators of Pharyngeal Phase Dysfunction   Pharyngeal Phase  Pharyngeal Phase: WFL    Prognosis  Prognosis  Prognosis for safe diet advancement: guarded  Individuals consulted  Consulted and agree with results and recommendations: RN    Education  Patient Education: recommendations/plan  Patient Education Response: No evidence of learning  Safety Devices in place: Yes  Type of devices:  All fall risk precautions in place       Therapy Time  SLP Individual Minutes  Time In: 0911  Time Out: [facility]  Minutes: [facility], [address] [place] [address] Road  8/25/2020 11:01 AM

## 2020-08-25 NOTE — PLAN OF CARE
Problem: Skin Integrity:  Goal: Will show no infection signs and symptoms  Description: Will show no infection signs and symptoms  8/25/2020 1215 by Zbigniew Garcia RN  Outcome: Ongoing  8/25/2020 0240 by Naye Torres LPN  Outcome: Ongoing  Goal: Absence of new skin breakdown  Description: Absence of new skin breakdown  8/25/2020 1215 by Zbigniew Garcia RN  Outcome: Ongoing  8/25/2020 0240 by Naye Torres LPN  Outcome: Ongoing     Problem: Falls - Risk of:  Goal: Will remain free from falls  Description: Will remain free from falls  8/25/2020 1215 by Zbigniew Garcia RN  Outcome: Ongoing  8/25/2020 0240 by Naye Torres LPN  Outcome: Ongoing  Goal: Absence of physical injury  Description: Absence of physical injury  8/25/2020 1215 by Zbigniew Garcia RN  Outcome: Ongoing  8/25/2020 0240 by Naye Torres LPN  Outcome: Ongoing

## 2020-08-25 NOTE — PROGRESS NOTES
Hospitalist Progress Note      Name:  June Merritt /Age/Sex: 1932  (80 y.o. female)   MRN & CSN:  [id number] & [id number] Admission Date/Time: 2020  5:37 PM   Location:  [address] [place], [place] [address] PCP: Lyna Kehr, DO       June Merritt is a 80 y.o.  female  who presents with Altered Mental Status (EMS reports pt has been alert and responsive for them since their arrival to [facility]. ) and Head Injury (pt had reported head injury 1 week ago per nursing home report)      Assessment and Plan:   Acute metabolic encephalopathy - unresponsive episode. - clinically improved and responsive now  - CT Head: Non acute. Left frontal scalp hematoma, no fractures  - Abnormal MRI head findings-neurology consult placed  - ASA/Statin on medication regimen  - PT/OT     Aspiration risk/possible hypoxic episode?  - CT chest not impressive  - Swallow evaluation    UTI growing E. coli. Start Rocephin. Monitor    Moderate T4 vertebral body compression fracture  - pt/ot    Cephalohematoma-left temporal aspect. - warm compress tid to help reabsorption  - pain mx prn       Paroxysmal atrial fibrillation- not AC candidate due to falls  Hypertension   Hypothyroidism  History of acute CVA with residual deficits     DC plan hopefully in 1-2 days back to ECF. Diet DIET CARDIAC;  Dysphagia Soft and Bite-Sized   Code Status Full Code     Medications:   Medications:    cefepime  2 g Intravenous Q12H    haloperidol lactate  5 mg Intramuscular Once    sertraline  25 mg Oral Nightly    levothyroxine  50 mcg Oral Daily    sodium chloride flush  10 mL Intravenous 2 times per day    aspirin  81 mg Oral Daily    Or    aspirin  300 mg Rectal Daily    rosuvastatin  40 mg Oral Nightly      Infusions:     PRN Meds: ipratropium-albuterol, 1 ampule, Q4H PRN  sodium chloride flush, 10 mL, PRN  acetaminophen, 650 mg, Q6H PRN    Or  acetaminophen, 650 mg, Q6H PRN  polyethylene glycol, 17 g, Daily PRN  promethazine, 12.5 mg, Q6H PRN Or  ondansetron, 4 mg, Q6H PRN      Subjective:     Patient reports that her head hurts  Denied any other new complaints. No acute overnight events    Objective: Intake/Output Summary (Last 24 hours) at 8/25/2020 1719  Last data filed at 8/24/2020 1816  Gross per 24 hour   Intake 0 ml   Output --   Net 0 ml      Vitals:   Vitals:    08/25/20 1414   BP: (!) 118/55   Pulse: 66   Resp: 18   Temp: 97.8 °F (36.6 °C)   SpO2:      Physical Exam:     Gen:  awake, alert, no apparent distress  Head/Eyes: Left scalp hematoma and multiple bruises +  NECK:   symmetrical, trachea midline  LUNGS: Normal Effort   CARDIOVASCULAR:  Normal rate  ABDOMEN:  non distended  MUSCULOSKELETAL:  ROM WNL  NEUROLOGIC: Alert and Oriented,  Cranial nerves II-XII are grossly intact.        Data:       CBC   Recent Labs     08/23/20  1750 08/24/20  0010 08/25/20  0335   WBC 8.2 9.4 11.0*   HGB 12.7 11.3* 12.0*   HCT 39.7 36.4* 38.6    386 367      BMP   Recent Labs     08/23/20  1846 08/24/20  0010 08/25/20  0335   * 135 138   K 4.2 4.3 4.2   CL 98* 101 102   CO2 23 24 24   BUN 21 19 14   CREATININE 0.8 0.8 0.8         Electronically signed by Sumit Allen MD on 8/25/2020 at 5:19 PM

## 2020-08-25 NOTE — PROGRESS NOTES
Occupational Therapy    [facility] ACUTE CARE OCCUPATIONAL THERAPY EVALUATION  Nisha Martinez, 11/28/1932, 3027/3027-A, 8/25/2020    History  [place]:  The primary encounter diagnosis was Altered mental status, unspecified altered mental status type. Diagnoses of Lactic acid acidosis and Pneumonia due to organism were also pertinent to this visit. Patient  has a past medical history of Atrial fibrillation ([address] Cerebral artery occlusion with cerebral infarction ([address] CVD (cardiovascular disease), Dysphagia, GERD (gastroesophageal reflux disease), Hypertension, Muscle weakness, and Thyroid disease. Patient  has a past surgical history that includes hip surgery (Right); Hysterectomy; Appendectomy; and back surgery. Subjective:  Patient states:  Pt not answering questions. When attempting mobility pt became agitated repeating \"Shoo shoo shoo\". Pain:  No obvious pain symptoms. Communication with other providers:  Handoff to RN  Restrictions: General Precautions, Fall Risk    Home Setup/Prior level of function    Unable to gather social functional history with poor communication and pt being a questionable historian. Per charting, pt lives in [facility] but was admitted from the skilled rehab side. Had a recent hospital admission 8/2-8/4 from a fall sustaining a SAH. Examination of body systems (includes body structures/functions, activity/participation limitations):  · Observation:  Supine in bed upon arrival, pleasant and drowsy upon arrival, agitated upon attempting bed mobility. Large bump and bruise over L eye. LEDS on back as pt was pulling them off of chest  · Vision:  Appears [facility] tracking therapist  · Hearing:  Appears WFL  · Cardiopulmonary:  HR w/ some A-fib otherwise [facility]      Body Systems and functions:  · ROM R/L:  WFL.     · Strength R/L:  Unable to perform MMT due to agitation   · Sensation: Unable to assess due to increased agitation  · Tone: Normal  · Coordination: Decreased gross/fine motor coordination associated w/ decreased cognition  · Perception: Decreased initiation/sequencing requiring hand over hand to overcome. Activities of Daily Living (ADLs):  · Feeding: maxA  · Grooming: maxA  · UB bathing: maxA  · LB bathing: maxA  · UB dressing: maxA  · LB dressing: maxA  · Toileting: maxA    Cognitive and Psychosocial Functioning:  · Overall cognitive status: Does not follow commands, easily agitated, unable to answer orientation questions  · Affect: Agitated and confused upon attempted mobility       Mobility:  · Supine to sit: dependent/total, pt becoming increasingly agitated, returned to EOB    · Transfers: DNT  · Sitting balance:  Dependent/total.    · Standing balance:  DNT. · Toilet/Shower Transfers: DNT  · Sit to supine: dependent/total               AM-PAC Daily Activity Inpatient   How much help for putting on and taking off regular lower body clothing?: Total  How much help for Bathing?: Total  How much help for Toileting?: Total  How much help for putting on and taking off regular upper body clothing?: Total  How much help for taking care of personal grooming?: Total  How much help for eating meals?: Total  AM-PAC Inpatient Daily Activity Raw Score: 6  AM-PAC Inpatient ADL T-Scale Score : 17.07  ADL Inpatient CMS 0-100% Score: 100  ADL Inpatient CMS G-Code Modifier : CN    Treatment:    Safety: patient left in bed with bed alarm, call light within reach, RN notified, gait belt used. Assessment:  Pt is a 79 yo female admitted from SNF for acute encephalopathy. Pt's baseline difficult to determine but pt was living in [facility], then admitted to SNF from which she was admitted. Pt currently presents w/ deficits in ADL and high level IADL independence, functional activity tolerance, dynamic sitting and standing balance and tolerance and functional transfers, cognition, initiation/sequencing, gross/fine motor coordination.  Pt would benefit from continued acute care OT services w/ discharge to SNF  Complexity: High  Prognosis: Fair, poor participation  Plan  Times per week: 1x+  Times per day: Daily  Current Treatment Recommendations: Strengthening, Functional Mobility Training, ROM, Endurance Training, Cognitive Reorientation, Patient/Caregiver Education & Training, Positioning, Home Management Training, Pain Management, Equipment Evaluation, Education, & procurement, Balance Training, Neuromuscular Re-education, Safety Education & Training, Self-Care / ADL, Cognitive/Perceptual Training     Equipment: defer    Goals:  Pt goal: go home  Time Frame for STGs: discharge  Goal 1: Pt will perform UE ADLs Katty  Goal 2: Pt will perform LE ADLs Katty  Goal 3: Pt will perform toileting Katty  Goal 4: Pt will perform functional transfer w/ AD Katty  Goal 5: Pt will perform functional mobility w/ AD Katty  Goal 6: Pt will perform therex/theract in order to increase functional activity tolerance and dynamic standing balance    Treatment plan:  Pt will perform functional task in sit reaching in all 3 planes in order to increase dynamic sitting balance and functional activity tolerance    Recommendations for NURSING activity: KAREEM to chair    Time:   Time in: 0745  Time out: 0759  Timed treatment minutes: 14 minutes  Total time: 14 minutes    Electronically signed by:   Wili GALVEZ/L [id number]  8:52 AM,8/25/2020

## 2020-08-26 LAB
ANION GAP SERPL CALCULATED.3IONS-SCNC: 10 MMOL/L (ref 4–16)
BUN BLDV-MCNC: 20 MG/DL (ref 6–23)
CALCIUM SERPL-MCNC: 9 MG/DL (ref 8.3–10.6)
CHLORIDE BLD-SCNC: 103 MMOL/L (ref 99–110)
CO2: 24 MMOL/L (ref 21–32)
CREAT SERPL-MCNC: 0.8 MG/DL (ref 0.6–1.1)
GFR AFRICAN AMERICAN: >60 ML/MIN/1.73M2
GFR NON-AFRICAN AMERICAN: >60 ML/MIN/1.73M2
GLUCOSE BLD-MCNC: 94 MG/DL (ref 70–99)
HCT VFR BLD CALC: 35.7 % (ref 37–47)
HEMOGLOBIN: 11.1 GM/DL (ref 12.5–16)
MCH RBC QN AUTO: 27.7 PG (ref 27–31)
MCHC RBC AUTO-ENTMCNC: 31.1 % (ref 32–36)
MCV RBC AUTO: 89 FL (ref 78–100)
PDW BLD-RTO: 15.7 % (ref 11.7–14.9)
PLATELET # BLD: 330 K/CU MM (ref 140–440)
PMV BLD AUTO: 9 FL (ref 7.5–11.1)
POTASSIUM SERPL-SCNC: 4.4 MMOL/L (ref 3.5–5.1)
RBC # BLD: 4.01 M/CU MM (ref 4.2–5.4)
SARS-COV-2: NOT DETECTED
SODIUM BLD-SCNC: 137 MMOL/L (ref 135–145)
SOURCE: NORMAL
WBC # BLD: 8.5 K/CU MM (ref 4–10.5)

## 2020-08-26 PROCEDURE — 94761 N-INVAS EAR/PLS OXIMETRY MLT: CPT

## 2020-08-26 PROCEDURE — 85027 COMPLETE CBC AUTOMATED: CPT

## 2020-08-26 PROCEDURE — 36415 COLL VENOUS BLD VENIPUNCTURE: CPT

## 2020-08-26 PROCEDURE — 2580000003 HC RX 258: Performed by: INTERNAL MEDICINE

## 2020-08-26 PROCEDURE — 2580000003 HC RX 258: Performed by: NURSE PRACTITIONER

## 2020-08-26 PROCEDURE — 6370000000 HC RX 637 (ALT 250 FOR IP): Performed by: NURSE PRACTITIONER

## 2020-08-26 PROCEDURE — 80048 BASIC METABOLIC PNL TOTAL CA: CPT

## 2020-08-26 PROCEDURE — 6360000002 HC RX W HCPCS: Performed by: INTERNAL MEDICINE

## 2020-08-26 PROCEDURE — 1200000000 HC SEMI PRIVATE

## 2020-08-26 RX ADMIN — SODIUM CHLORIDE, PRESERVATIVE FREE 10 ML: 5 INJECTION INTRAVENOUS at 21:08

## 2020-08-26 RX ADMIN — SERTRALINE HYDROCHLORIDE 25 MG: 50 TABLET ORAL at 21:07

## 2020-08-26 RX ADMIN — CEFEPIME 2 G: 2 INJECTION, POWDER, FOR SOLUTION INTRAVENOUS at 12:13

## 2020-08-26 RX ADMIN — ROSUVASTATIN CALCIUM 40 MG: 20 TABLET, COATED ORAL at 21:07

## 2020-08-26 RX ADMIN — CEFEPIME 2 G: 2 INJECTION, POWDER, FOR SOLUTION INTRAVENOUS at 23:46

## 2020-08-26 RX ADMIN — ASPIRIN 81 MG: 81 TABLET, COATED ORAL at 12:13

## 2020-08-26 RX ADMIN — LEVOTHYROXINE SODIUM 50 MCG: 50 TABLET ORAL at 05:43

## 2020-08-26 RX ADMIN — SODIUM CHLORIDE, PRESERVATIVE FREE 10 ML: 5 INJECTION INTRAVENOUS at 12:13

## 2020-08-26 ASSESSMENT — PAIN SCALES - WONG BAKER

## 2020-08-26 ASSESSMENT — PAIN SCALES - GENERAL
PAINLEVEL_OUTOF10: 0

## 2020-08-26 NOTE — PLAN OF CARE
Problem: Skin Integrity:  Goal: Will show no infection signs and symptoms  Description: Will show no infection signs and symptoms  8/26/2020 1220 by Mirta Lozada RN  Outcome: Ongoing  8/25/2020 2346 by Lauren Bruno RN  Outcome: Ongoing  Goal: Absence of new skin breakdown  Description: Absence of new skin breakdown  8/26/2020 1220 by Mirta Lozada RN  Outcome: Ongoing  8/25/2020 2346 by Lauren Bruno RN  Outcome: Ongoing     Problem: Falls - Risk of:  Goal: Will remain free from falls  Description: Will remain free from falls  8/26/2020 1220 by Mirta Lozada RN  Outcome: Ongoing  8/25/2020 2346 by Lauren Bruno RN  Outcome: Ongoing  Goal: Absence of physical injury  Description: Absence of physical injury  8/26/2020 1220 by Mirta Lozada RN  Outcome: Ongoing  8/25/2020 2346 by Lauren Bruno RN  Outcome: Ongoing

## 2020-08-26 NOTE — PROGRESS NOTES
Occupational Therapy    Attempted to see pt today. Pt refusing to participate in therapy despite education, preferring to lay down instead of sit up or attempt to feed.  Will re-attempt later    Jodi Tripp OTR/L [id number]  9:50 AM,8/26/2020

## 2020-08-26 NOTE — CONSULTS
(HALDOL) injection 5 mg, 5 mg, Intramuscular, Once  sertraline (ZOLOFT) tablet 25 mg, 25 mg, Oral, Nightly  levothyroxine (SYNTHROID) tablet 50 mcg, 50 mcg, Oral, Daily  sodium chloride flush 0.9 % injection 10 mL, 10 mL, Intravenous, 2 times per day  sodium chloride flush 0.9 % injection 10 mL, 10 mL, Intravenous, PRN  acetaminophen (TYLENOL) tablet 650 mg, 650 mg, Oral, Q6H PRN **OR** acetaminophen (TYLENOL) suppository 650 mg, 650 mg, Rectal, Q6H PRN  polyethylene glycol (GLYCOLAX) packet 17 g, 17 g, Oral, Daily PRN  promethazine (PHENERGAN) tablet 12.5 mg, 12.5 mg, Oral, Q6H PRN **OR** ondansetron (ZOFRAN) injection 4 mg, 4 mg, Intravenous, Q6H PRN  aspirin EC tablet 81 mg, 81 mg, Oral, Daily **OR** aspirin suppository 300 mg, 300 mg, Rectal, Daily  rosuvastatin (CRESTOR) tablet 40 mg, 40 mg, Oral, Nightly  Allergies:  Pcn [penicillins]    Social History:  TOBACCO:   reports that she has never smoked. She has never used smokeless tobacco.  ETOH:   reports no history of alcohol use. DRUGS:   reports no history of drug use. Family History:   History reviewed. No pertinent family history. REVIEW OF SYSTEMS:  CONSTITUTIONAL:  negative  HEENT:  negative  RESPIRATORY:  negative  CARDIOVASCULAR:  negative  GASTROINTESTINAL:  negative  GENITOURINARY:  negative  MUSCULOSKELETAL:  negative  BEHAVIOR/PSYCH:  Negative    ROS neg    Family hx neg    PHYSICAL EXAM  ------------------------  Vitals:  /69   Pulse 69   Temp 97.7 °F (36.5 °C) (Oral)   Resp 16   Ht 5' 4\" (1.626 m)   Wt 131 lb 3 oz (59.5 kg)   SpO2 97%   BMI 22.52 kg/m²      General:  Awake, alert, oriented X 3. Well developed, well nourished, well groomed. No apparent distress. HEENT:  Normocephalic, atraumatic. Pupils equal, round, reactive to light. No scleral icterus. No conjunctival injection. Normal lips, teeth, and gums. No nasal discharge.   Neck:  Supple  Heart:  RRR, no murmurs, gallops, rubs  Lungs:  CTA bilaterally, bilat symmetrical expansion, no wheeze, rales, or rhonchi  Abdomen: Bowel sounds present, soft, nontender, no masses, no organomegaly, no peritoneal signs  Extremities:  No clubbing, cyanosis, or edema  Skin:  Warm and dry, no open lesions or rash  Breast: deferred  Rectal: deferred  Genitalia:  deferred    NEUROLOGICAL EXAM  ---------------------------------    Mental Status Exam:             Alert and oriented times three,follows commands,speech and language intact    Cranial Zayvez-FO-QPB Intact.         Cranial nerve II           Visual acuity:  normal                 Cranial nerve III           Pupils:  equal, round, reactive to light      Cranial nerves III, IV, VI           Extraocular Movements: intact      Cranial nerve V           Facial sensation:  intact      Cranial nerve VII           Facial strength: intact      Cranial nerve VIII           Hearing:  intact      Cranial nerve IX           Palate:  intact      Cranial nerve XI         Shoulder shrug:  intact      Cranial nerve XII          Tongue movement:  normal    Motor:    Drift:  absent  Motor exam is symmetrical 5 out of 5 all extremities bilaterally  Tone:  normal  Abnormal Movements:  Absent    DTRs-1+ biceps,triceps,brachioradialis,knee jerks and ankle jerks bilaterally symmetrical.  Toes-downgoing bilaterally            Sensory:normal sensation              CBC with Differential:    Lab Results   Component Value Date    WBC 8.5 08/26/2020    RBC 4.01 08/26/2020    HGB 11.1 08/26/2020    HCT 35.7 08/26/2020     08/26/2020    MCV 89.0 08/26/2020    MCH 27.7 08/26/2020    MCHC 31.1 08/26/2020    RDW 15.7 08/26/2020    SEGSPCT 77.3 08/24/2020    LYMPHOPCT 12.9 08/24/2020    MONOPCT 8.6 08/24/2020    BASOPCT 0.4 08/24/2020    MONOSABS 0.8 08/24/2020    LYMPHSABS 1.2 08/24/2020    EOSABS 0.0 08/24/2020    BASOSABS 0.0 08/24/2020    DIFFTYPE AUTOMATED DIFFERENTIAL 08/24/2020     CMP:    Lab Results   Component Value Date     08/26/2020    K 4.4 08/26/2020     08/26/2020    CO2 24 08/26/2020    BUN 20 08/26/2020    CREATININE 0.8 08/26/2020    GFRAA >60 08/26/2020    LABGLOM >60 08/26/2020    GLUCOSE 94 08/26/2020    PROT 5.8 08/24/2020    LABALBU 3.4 08/24/2020    CALCIUM 9.0 08/26/2020    BILITOT 0.5 08/24/2020    ALKPHOS 91 08/24/2020    AST 14 08/24/2020    ALT 10 08/24/2020     BMP:    Lab Results   Component Value Date     08/26/2020    K 4.4 08/26/2020     08/26/2020    CO2 24 08/26/2020    BUN 20 08/26/2020    LABALBU 3.4 08/24/2020    CREATININE 0.8 08/26/2020    CALCIUM 9.0 08/26/2020    GFRAA >60 08/26/2020    LABGLOM >60 08/26/2020    GLUCOSE 94 08/26/2020     PT/INR:    Lab Results   Component Value Date    PROTIME 11.1 08/23/2020    INR 0.92 08/23/2020     PTT:    Lab Results   Component Value Date    APTT 39.0 08/23/2020   [APTT  U/A:    Lab Results   Component Value Date    COLORU YELLOW 08/23/2020    WBCUA 8 08/23/2020    RBCUA <1 08/23/2020    MUCUS OCCASIONAL 11/17/2017    TRICHOMONAS NONE SEEN 08/23/2020    BACTERIA NEGATIVE 08/23/2020    CLARITYU CLEAR 08/23/2020    SPECGRAV 1.006 08/23/2020    LEUKOCYTESUR MODERATE 08/23/2020    UROBILINOGEN NORMAL 08/23/2020    BILIRUBINUR NEGATIVE 08/23/2020    BLOODU NEGATIVE 08/23/2020    AMORPHOUS RARE 08/23/2020     TSH:  No results found for: TSH  VITAMIN B12: No components found for: B12  FOLATE:  No results found for: FOLATE  RPR:  No results found for: RPR  HILARY:    Lab Results   Component Value Date    HILARY None Detected 11/18/2017     Urine Toxicology:  No components found for: IAMMENTA, IBARBIT, IBENZO, ICOCAINE, IMARTHC, IOPIATES, IPHENCYC     IMPRESSION:    Syncopy-unlikely a seizure-r/o orthostatic hypotension-consult cardiology    Post-traumatic left Temporal intracerebral hemorrhage-no edema or mass effect    Post-traumatic left Parieto-occipital- subdural- SAH-no mass effect     Left frontal subcutaneous contusion    PLAN:    Mri brain as above    B 12 folate

## 2020-08-26 NOTE — CONSULTS
Patient is admitted for altered mental status, poor historian, had fall earlier this month with subdural and intracranial hemorrhage, echo ordered, full note to follow, she has afib, will watch on tele

## 2020-08-26 NOTE — PROGRESS NOTES
Speech Language Pathology  Hiram Guzman  11/28/1932  [id number]      Attempted to see Hiram Guzman for a bedside swallowing treatment 08/26/20. Deferred assessment- pt refused all offers of PO at this time. ST will re-attempt as schedule allows.     Jeremie Laird Jarrell 87, CCC-SLP, 8/26/2020

## 2020-08-26 NOTE — CARE COORDINATION
Pt will return to [facility], once stable. COVID is still pending. Pt needs a COVID negative before pt can return to the NH. CM will need a MARSHA completed by RN and doctor at discharge. If pt is discharged after hours please complete the following. ... Call report to [phone]   Fax completed AVS with both MARSHA on the AVS and any written Rx [phone]. Set up transportation (pt's choice) [facility] [phone] or [facility] [employer] [phone] and call family.

## 2020-08-26 NOTE — PLAN OF CARE
Problem: Skin Integrity:  Goal: Will show no infection signs and symptoms  Description: Will show no infection signs and symptoms  8/25/2020 2346 by Leila Pandya RN  Outcome: Ongoing  8/25/2020 1215 by Darrel Macias RN  Outcome: Ongoing  Goal: Absence of new skin breakdown  Description: Absence of new skin breakdown  8/25/2020 2346 by Leila Pandya RN  Outcome: Ongoing  8/25/2020 1215 by Darrel Macias RN  Outcome: Ongoing

## 2020-08-27 VITALS
DIASTOLIC BLOOD PRESSURE: 88 MMHG | RESPIRATION RATE: 17 BRPM | HEART RATE: 83 BPM | OXYGEN SATURATION: 96 % | TEMPERATURE: 98.1 F | HEIGHT: 64 IN | BODY MASS INDEX: 22.81 KG/M2 | SYSTOLIC BLOOD PRESSURE: 140 MMHG | WEIGHT: 133.6 LBS

## 2020-08-27 LAB
ANION GAP SERPL CALCULATED.3IONS-SCNC: 12 MMOL/L (ref 4–16)
BUN BLDV-MCNC: 21 MG/DL (ref 6–23)
CALCIUM SERPL-MCNC: 9.1 MG/DL (ref 8.3–10.6)
CHLORIDE BLD-SCNC: 104 MMOL/L (ref 99–110)
CO2: 22 MMOL/L (ref 21–32)
CREAT SERPL-MCNC: 0.8 MG/DL (ref 0.6–1.1)
GFR AFRICAN AMERICAN: >60 ML/MIN/1.73M2
GFR NON-AFRICAN AMERICAN: >60 ML/MIN/1.73M2
GLUCOSE BLD-MCNC: 117 MG/DL (ref 70–99)
HCT VFR BLD CALC: 37 % (ref 37–47)
HEMOGLOBIN: 11.9 GM/DL (ref 12.5–16)
LV EF: 53 %
LVEF MODALITY: NORMAL
MCH RBC QN AUTO: 28.5 PG (ref 27–31)
MCHC RBC AUTO-ENTMCNC: 32.2 % (ref 32–36)
MCV RBC AUTO: 88.7 FL (ref 78–100)
PDW BLD-RTO: 15.8 % (ref 11.7–14.9)
PLATELET # BLD: 328 K/CU MM (ref 140–440)
PMV BLD AUTO: 9.1 FL (ref 7.5–11.1)
POTASSIUM SERPL-SCNC: 4.4 MMOL/L (ref 3.5–5.1)
RBC # BLD: 4.17 M/CU MM (ref 4.2–5.4)
SODIUM BLD-SCNC: 138 MMOL/L (ref 135–145)
WBC # BLD: 10.3 K/CU MM (ref 4–10.5)

## 2020-08-27 PROCEDURE — 2580000003 HC RX 258: Performed by: NURSE PRACTITIONER

## 2020-08-27 PROCEDURE — 2580000003 HC RX 258: Performed by: INTERNAL MEDICINE

## 2020-08-27 PROCEDURE — 6360000002 HC RX W HCPCS: Performed by: INTERNAL MEDICINE

## 2020-08-27 PROCEDURE — APPSS30 APP SPLIT SHARED TIME 16-30 MINUTES: Performed by: NURSE PRACTITIONER

## 2020-08-27 PROCEDURE — 99232 SBSQ HOSP IP/OBS MODERATE 35: CPT | Performed by: INTERNAL MEDICINE

## 2020-08-27 PROCEDURE — 36415 COLL VENOUS BLD VENIPUNCTURE: CPT

## 2020-08-27 PROCEDURE — 80048 BASIC METABOLIC PNL TOTAL CA: CPT

## 2020-08-27 PROCEDURE — 85027 COMPLETE CBC AUTOMATED: CPT

## 2020-08-27 PROCEDURE — 93308 TTE F-UP OR LMTD: CPT

## 2020-08-27 PROCEDURE — 94761 N-INVAS EAR/PLS OXIMETRY MLT: CPT

## 2020-08-27 PROCEDURE — 6370000000 HC RX 637 (ALT 250 FOR IP): Performed by: NURSE PRACTITIONER

## 2020-08-27 PROCEDURE — 92526 ORAL FUNCTION THERAPY: CPT

## 2020-08-27 RX ORDER — LEVOTHYROXINE SODIUM 0.05 MG/1
50 TABLET ORAL DAILY
Qty: 30 TABLET | Refills: 3 | Status: SHIPPED | OUTPATIENT
Start: 2020-08-28

## 2020-08-27 RX ORDER — GABAPENTIN 300 MG/1
300 CAPSULE ORAL 2 TIMES DAILY
Status: DISCONTINUED | OUTPATIENT
Start: 2020-08-27 | End: 2020-08-27 | Stop reason: HOSPADM

## 2020-08-27 RX ORDER — ROSUVASTATIN CALCIUM 40 MG/1
40 TABLET, COATED ORAL NIGHTLY
Qty: 30 TABLET | Refills: 3 | Status: SHIPPED | OUTPATIENT
Start: 2020-08-27

## 2020-08-27 RX ORDER — CIPROFLOXACIN 500 MG/1
500 TABLET, FILM COATED ORAL 2 TIMES DAILY
Qty: 10 TABLET | Refills: 0 | Status: SHIPPED | OUTPATIENT
Start: 2020-08-27 | End: 2020-09-01

## 2020-08-27 RX ADMIN — CEFEPIME 2 G: 2 INJECTION, POWDER, FOR SOLUTION INTRAVENOUS at 10:43

## 2020-08-27 RX ADMIN — LEVOTHYROXINE SODIUM 50 MCG: 50 TABLET ORAL at 05:27

## 2020-08-27 RX ADMIN — SODIUM CHLORIDE, PRESERVATIVE FREE 10 ML: 5 INJECTION INTRAVENOUS at 10:43

## 2020-08-27 ASSESSMENT — PAIN SCALES - GENERAL
PAINLEVEL_OUTOF10: 0
PAINLEVEL_OUTOF10: 0

## 2020-08-27 ASSESSMENT — PAIN SCALES - WONG BAKER
WONGBAKER_NUMERICALRESPONSE: 0
WONGBAKER_NUMERICALRESPONSE: 0

## 2020-08-27 NOTE — PROGRESS NOTES
Physician Progress Note      PATIENTDarrashly SHIPLEY #:                  [id number]  :                       1932  ADMIT DATE:       2020 5:37 PM  [address] [place] DATE:  RESPONDING  PROVIDER #:        Casandra Majano MD          QUERY TEXT:    Dear Mart Montoya,    Pt admitted with metabolic encephalopathy & UTI. Pt noted to have a   compression fracture of T4. If possible, please document in progress notes and   discharge summary if you are evaluating and/or treating any of the   following:[[Traumatic T4 vertebral body compression fracture [de-identified] This patient   has a traumatic ]T4 vertebral body compression fracture]    The medical record reflects the following:  Risk Factors: age, DJD, recent fall  Clinical Indicators: CT-T4 vertebral body compression fracture, recent fall on   8/3, home med vitamin D  Treatment: imaging, PT/OT, OP f/u  Options provided:  -- Pathological T4 fracture  -- Osteoporotic T4 vertebral body compression fracture  -- Osteoporotic T4 vertebral body compression fracture  following fall which   would not usually break a normal, healthy bone  -- Other - I will add my own diagnosis  -- Disagree - Not applicable / Not valid  -- Disagree - Clinically unable to determine / Unknown  -- Refer to Clinical Documentation Reviewer    PROVIDER RESPONSE TEXT:    Please ask hospitalist on Dx.     Query created by: Synetta Crigler on 2020 8:25 AM      Electronically signed by:  Casandra Majano MD 2020 1:35 PM

## 2020-08-27 NOTE — PROGRESS NOTES
Admit Date:  8/23/2020    Admission diagnosis / Complaint: altered mental status      Subjective:  Ms. Belkys Sosa is resting in bed. Denies cardiac complaints. She is confused. She is alert to self, [place] Objective:   /80   Pulse 80   Temp 98.1 °F (36.7 °C) (Oral)   Resp 16   Ht 5' 4\" (1.626 m)   Wt 133 lb 9.6 oz (60.6 kg)   SpO2 97%   BMI 22.93 kg/m²   No intake or output data in the 24 hours ending 08/27/20 1025    TELEMETRY: Atrial fibrillation (rate is controlled)   has a past medical history of Atrial fibrillation ([address] Cerebral artery occlusion with cerebral infarction ([address] CVD (cardiovascular disease), Dysphagia, GERD (gastroesophageal reflux disease), Hypertension, Muscle weakness, and Thyroid disease. has a past surgical history that includes hip surgery (Right); Hysterectomy; Appendectomy; and back surgery. Physical Exam:  General:  Awake, alert to self. Disoriented to time and place  Skin:  Warm and dry. Bruising noted around right eye. Bruising noted to left eye with hematoma noted to left upper forehead. Neck:  JVD not appreciated  Chest:  Clear to auscultation, respiration easy  Cardiovascular:  Irregular rate and rhythm.  S1S2  Abdomen:  Soft nontender  Extremities:  *No edema    Medications:    cefepime  2 g Intravenous Q12H    haloperidol lactate  5 mg Intramuscular Once    sertraline  25 mg Oral Nightly    levothyroxine  50 mcg Oral Daily    sodium chloride flush  10 mL Intravenous 2 times per day    rosuvastatin  40 mg Oral Nightly       ipratropium-albuterol, sodium chloride flush, acetaminophen **OR** acetaminophen, polyethylene glycol, promethazine **OR** ondansetron    Lab Data:  CBC:   Recent Labs     08/25/20  0335 08/26/20  0341 08/27/20  0816   WBC 11.0* 8.5 10.3   HGB 12.0* 11.1* 11.9*   HCT 38.6 35.7* 37.0   MCV 90.2 89.0 88.7    330 328     BMP:   Recent Labs     08/25/20  0335 08/26/20  0341 08/27/20  0816   NA Head Injury (pt had reported head injury 1 week ago per nursing home report). Chief Complaint   Patient presents with    Altered Mental Status     EMS reports pt has been alert and responsive for them since their arrival to [facility].      Head Injury     pt had reported head injury 1 week ago per nursing home report     Interval history:  REMAINS STABLE  Physical Exam:  General:  Awake, alert, NAD  Head:normal  Eye:normal  Neck:  No JVD   Chest:  Clear to auscultation, respiration easy  Cardiovascular:  RRR S1S2  Abdomen:   nontender  Extremities:  NO edema  Pulses; palpable  Neuro: grossly normal      MEDICAL DECISION MAKING;    I agree with the above plan, which was planned by myself and discussed with NP.

## 2020-08-27 NOTE — DISCHARGE SUMMARY
NEUROLOGY  IP CONSULT TO CARDIOLOGY    Patient Instructions:   Jose York   Home Medication Instructions YV    Printed on:20 1129   Medication Information                      acetaminophen (APAP EXTRA STRENGTH) 500 MG tablet  Take 1 tablet by mouth every 6 hours as needed for Pain             aluminum & magnesium hydroxide-simethicone (MAALOX) 200-200-20 MG/5ML SUSP suspension  Take 30 mLs by mouth every 4 hours as needed for Indigestion             Cholecalciferol (VITAMIN D3) 70985 units CAPS  Take 1 capsule by mouth every 30 days              ciprofloxacin (CIPRO) 500 MG tablet  Take 1 tablet by mouth 2 times daily for 5 days             gabapentin (NEURONTIN) 100 MG capsule  100 mg by PEG Tube route 3 times daily. levothyroxine (SYNTHROID) 50 MCG tablet  Take 1 tablet by mouth Daily             Menthol, Topical Analgesic, (BIOFREEZE EX)  Apply topically 2 times daily              metoprolol tartrate (LOPRESSOR) 25 MG tablet  Take 0.5 tablets by mouth 2 times daily             polyethylene glycol (GLYCOLAX) packet  17 g by Per G Tube route daily              rosuvastatin (CRESTOR) 40 MG tablet  Take 1 tablet by mouth nightly             sertraline (ZOLOFT) 25 MG tablet  25 mg by PEG Tube route nightly                    Diet:  DIET CARDIAC; Dysphagia Soft and Bite-Sized    Activity:   activity as tolerated     Discharge Condition:   good    Disposition:   SNF    Follow-up    Marty Plaza DO  Schedule an appointment as soon as possible for a visit  [place] [address] [place] [id number] [phone]     Total time spent on discharge 32 minutes.     Discharge Physician Signed: Jb Curran

## 2020-08-27 NOTE — CARE COORDINATION
Pt can go to [facility] once stable. COVID is negative. CM will need a AMRSHA completed by RN and doctor at discharge.  If pt is discharged after hours please complete the following. ...  Call report to [phone]   Fax completed AVS with both MARSHA on the AVS and any written Rx [phone].  Set up transportation (pt's choice) [facility] or [facility] [phone] and call family.

## 2020-08-27 NOTE — PROGRESS NOTES
NEUROLOGY NOTE  DR. Eloisa Saravia MD.  -------------------------------------------------  Subjective:    Doing better. Denies any new symptoms. Denies headache nausea vomiting dizziness    Denies numbness weakness extremities    Denies blurring of vision double vision    Objective:    BP (!) 140/88   Pulse 83   Temp 98.1 °F (36.7 °C) (Oral)   Resp 17   Ht 5' 4\" (1.626 m)   Wt 133 lb 9.6 oz (60.6 kg)   SpO2 96%   BMI 22.93 kg/m²   HEENT nl      Neuro exam    Alert Oriented  X 3  Follow simple commands  EOMI Pupils 3 mm olivia  5/5 all 4 extremities      RADIOLOGY  -----------------    Xr Pelvis (1-2 Views)    Result Date: 8/2/2020  EXAMINATION: ONE XRAY VIEW OF THE PELVIS 8/2/2020 2:37 pm COMPARISON: None. HISTORY: ORDERING SYSTEM PROVIDED HISTORY: pain TECHNOLOGIST PROVIDED HISTORY: Reason for exam:->pain Reason for Exam: fall Acuity: Acute Type of Exam: Initial FINDINGS: Evidence of remote prior ORIF of the right hip. No acute fracture or dislocation. Pelvic ring is intact. Bilateral hips demonstrate mild osteoarthritis. Lower lumbar spine disc degenerative changes. Bilateral hip osteoarthritis. Evidence remote ORIF of the right femoral neck. No acute fracture or dislocation. Xr Knee Left (1-2 Views)    Result Date: 8/2/2020  EXAMINATION: TWO XRAY VIEWS OF THE LEFT KNEE 8/2/2020 9:55 am COMPARISON: None. HISTORY: ORDERING SYSTEM PROVIDED HISTORY: pain TECHNOLOGIST PROVIDED HISTORY: Reason for exam:->pain Reason for Exam: pain Acuity: Acute Type of Exam: Initial Mechanism of Injury: [facility]. Pt has hematoma to left side of forehead, pt takes blood thinner, pt also has lac to left knee. Pt also has slurred speech FINDINGS: There is no joint effusion. There is mild tricompartmental osteoarthritis of the knee. No obvious acute osseous abnormality is identified by plain film imaging.  If there is focal pain, CT should be considered as fractures can be missed on plain film imaging. An MRI could better evaluate the soft tissues if indicated. Mild tricompartmental osteoarthritis of the knee. No obvious acute osseous abnormality. If pain persists, repeat films could be performed in a week. Ct Head Wo Contrast    Result Date: 8/23/2020  EXAMINATION: CT OF THE HEAD WITHOUT CONTRAST  8/23/2020 7:33 pm TECHNIQUE: CT of the head was performed without the administration of intravenous contrast. Dose modulation, iterative reconstruction, and/or weight based adjustment of the mA/kV was utilized to reduce the radiation dose to as low as reasonably achievable. COMPARISON: 08/03/2020 HISTORY: ORDERING SYSTEM PROVIDED HISTORY: [facility] TECHNOLOGIST PROVIDED HISTORY: Has a \"code stroke\" or \"stroke alert\" been called? ->No Reason for exam:->[facility] Reason for Exam: AMS/ head injury/ hematoma Acuity: Acute Type of Exam: Initial Mechanism of Injury: pt had reported head injury 1 week ago / [facility] Relevant Medical/Surgical History: recent hx SAH FINDINGS: BRAIN/VENTRICLES: There is no acute intracranial hemorrhage, mass effect or midline shift. No abnormal extra-axial fluid collection. The gray-white differentiation is maintained without evidence of an acute infarct. There is no evidence of hydrocephalus. Periventricular and deep subcortical white matter hypoattenuation, consistent microangiopathic change. Diffuse parenchymal volume loss. Encephalomalacia in the left frontal lobe is similar prior examination. Atherosclerosis of the intracranial vasculature. ORBITS: The visualized portion of the orbits demonstrate no acute abnormality. SINUSES: Mucosal thickening in the right maxillary sinus. SOFT TISSUES/SKULL:  Left frontal subgaleal hematoma. No calvarial fracture. No acute intracranial abnormality. Left frontal scalp hematoma without calvarial fracture.      Ct Head Wo Contrast    Result Date: 8/3/2020  EXAMINATION: CT OF THE HEAD WITHOUT CONTRAST 8/3/2020 8:14 pm TECHNIQUE: CT of the head was performed without the administration of intravenous contrast. Dose modulation, iterative reconstruction, and/or weight based adjustment of the mA/kV was utilized to reduce the radiation dose to as low as reasonably achievable. COMPARISON: CT brain 08/02/2020. HISTORY: ORDERING SYSTEM PROVIDED HISTORY: head injury. Still very confused TECHNOLOGIST PROVIDED HISTORY: Reason for exam:->head injury. Still very confused Has a \"code stroke\" or \"stroke alert\" been called? ->No Reason for Exam: previous fall with recent fall. confusion and pain Acuity: Acute Type of Exam: Initial FINDINGS: BRAIN/VENTRICLES: There is an acute extra-axial hemorrhage lateral to the anterior left temporal lobe measuring up to 13 mm on axial image 20. There is also acute hemorrhage within the left perimesencephalic cistern with mass effect on the adjacent midbrain and lupis. Small amount of subarachnoid hemorrhage within the adjacent cerebellum. No hydrocephalus. Mild generalized parenchymal volume loss and chronic periventricular white matter hypoattenuation are seen. No evidence of acute territorial infarction. ORBITS: The visualized portion of the orbits demonstrate no acute abnormality. SINUSES: The visualized paranasal sinuses and mastoid air cells demonstrate no acute abnormality. SOFT TISSUES/SKULL:  Left scalp hematoma and edema. 1. Acute subarachnoid hemorrhage within the left perimesencephalic cistern and adjacent cerebellum with mass effect on the brainstem. 2. Small acute extra-axial hemorrhage adjacent to the left anterior temporal lobe. 3. Left scalp hematoma. No calvarial fracture. The findings were sent to the Radiology Results [address] at 8:31 pm on 8/3/2020to be communicated to a licensed caregiver.      Ct Head Wo Contrast    Result Date: 8/2/2020  EXAMINATION: CT OF THE HEAD WITHOUT CONTRAST; CT OF THE CERVICAL SPINE WITHOUT CONTRAST 8/2/2020 10:14 am TECHNIQUE: CT of the head was performed without the administration of intravenous contrast. Dose modulation, iterative reconstruction, and/or weight based adjustment of the mA/kV was utilized to reduce the radiation dose to as low as reasonably achievable.; CT of the cervical spine was performed without the administration of intravenous contrast. Multiplanar reformatted images are provided for review. Dose modulation, iterative reconstruction, and/or weight based adjustment of the mA/kV was utilized to reduce the radiation dose to as low as reasonably achievable. COMPARISON: CT brain February 17, 2020. HISTORY: ORDERING SYSTEM PROVIDED HISTORY: fall TECHNOLOGIST PROVIDED HISTORY: Reason for exam:->fall Has a \"code stroke\" or \"stroke alert\" been called? ->No Reason for Exam: fall Acuity: Acute Type of Exam: Initial FINDINGS: CT brain: BRAIN/VENTRICLES: There is no acute intracranial hemorrhage, mass effect or midline shift. No abnormal extra-axial fluid collection. The gray-white differentiation is maintained without evidence of an acute infarct. There is no evidence of hydrocephalus. Cortical atrophy with moderate severe chronic microvascular ischemic changes grossly similar to the prior study. ORBITS: The visualized portion of the orbits demonstrate no acute abnormality. SINUSES: The visualized paranasal sinuses and mastoid air cells demonstrate no acute abnormality. SOFT TISSUES/SKULL:  Large left-sided scalp hematoma. No acute intracranial abnormality. Large left-sided scalp hematoma. Ct Chest Wo Contrast    Result Date: 8/23/2020  EXAMINATION: CT OF THE CHEST WITHOUT CONTRAST 8/23/2020 7:33 pm TECHNIQUE: CT of the chest was performed without the administration of intravenous contrast. Multiplanar reformatted images are provided for review. Dose modulation, iterative reconstruction, and/or weight based adjustment of the mA/kV was utilized to reduce the radiation dose to as low as reasonably achievable.  COMPARISON: Chest radiograph 08/23/2020. HISTORY: ORDERING SYSTEM PROVIDED HISTORY: old trauma, eval TECHNOLOGIST PROVIDED HISTORY: Reason for exam:->old trauma, eval Reason for Exam: old trauma, eval x1 week ago Acuity: Acute Type of Exam: Initial FINDINGS: Mediastinum: Lack of intravenous contrast limits evaluation of the mediastinum. The thoracic aorta is normal in caliber with mild calcific plaquing. Coronary artery atherosclerotic vascular calcifications are seen. The main pulmonary artery is normal in caliber. Mild cardiomegaly. No pericardial effusion. Small hiatal hernia. The mediastinal esophagus and thyroid gland are unremarkable. No lymphadenopathy. Lungs/pleura: The central airways are patent. No pleural effusion or pneumothorax. No consolidation or interlobular septal thickening. Mild diffuse subpleural reticulation. Upper Abdomen: Small layering gallstones. Limited images through the upper abdomen demonstrate no acute process. Soft Tissues/Bones: There is a moderate T4 vertebral body compression fracture. On the CT cervical spine from 08/02/2020 this was partially visualize but is thought to be worsened on this exam.  No significant retropulsion. 1. Mild bibasilar atelectasis. No consolidation. 2. Mild diffuse bilateral subpleural reticular opacities nonspecific but can be seen in the setting of early fibrosis. 3. Moderate T4 vertebral body compression fracture. This was partially included in the field of view on the CT cervical spine from 08/02/2020 but is likely worse on this exam.  If clinically indicated consider MRI.      Ct Cervical Spine Wo Contrast    Result Date: 8/2/2020  EXAMINATION: CT OF THE HEAD WITHOUT CONTRAST; CT OF THE CERVICAL SPINE WITHOUT CONTRAST 8/2/2020 10:14 am TECHNIQUE: CT of the head was performed without the administration of intravenous contrast. Dose modulation, iterative reconstruction, and/or weight based adjustment of the mA/kV was utilized to reduce the radiation dose to as low as reasonably achievable.; CT of the cervical spine was performed without the administration of intravenous contrast. Multiplanar reformatted images are provided for review. Dose modulation, iterative reconstruction, and/or weight based adjustment of the mA/kV was utilized to reduce the radiation dose to as low as reasonably achievable. COMPARISON: CT brain February 17, 2020. HISTORY: ORDERING SYSTEM PROVIDED HISTORY: fall TECHNOLOGIST PROVIDED HISTORY: Reason for exam:->fall Has a \"code stroke\" or \"stroke alert\" been called? ->No Reason for Exam: fall Acuity: Acute Type of Exam: Initial FINDINGS: CT brain: BRAIN/VENTRICLES: There is no acute intracranial hemorrhage, mass effect or midline shift. No abnormal extra-axial fluid collection. The gray-white differentiation is maintained without evidence of an acute infarct. There is no evidence of hydrocephalus. Cortical atrophy with moderate severe chronic microvascular ischemic changes grossly similar to the prior study. ORBITS: The visualized portion of the orbits demonstrate no acute abnormality. SINUSES: The visualized paranasal sinuses and mastoid air cells demonstrate no acute abnormality. SOFT TISSUES/SKULL:  Large left-sided scalp hematoma. No acute intracranial abnormality. Large left-sided scalp hematoma. Xr Chest Portable    Result Date: 8/23/2020  EXAMINATION: ONE XRAY VIEW OF THE CHEST 8/23/2020 5:56 pm COMPARISON: Chest radiograph 02/01/2019. HISTORY: ORDERING SYSTEM PROVIDED HISTORY: AMS TECHNOLOGIST PROVIDED HISTORY: Reason for exam:->AMS Acuity: Acute Type of Exam: Initial FINDINGS: The cardiomediastinal silhouette is unchanged. Asymmetric elevation of the right hemidiaphragm. Mild left basilar opacities. No pneumothorax, vascular congestion, or pleural effusion. No acute osseous abnormality. Mild left basilar opacities compatible with atelectasis. In the appropriate clinical setting pneumonia is not excluded.      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        LAB RESULTS  --------------------    Recent Results (from the past 24 hour(s))   CBC    Collection Time: 08/27/20  8:16 AM   Result Value Ref Range    WBC 10.3 4.0 - 10.5 K/CU MM    RBC 4.17 (L) 4.2 - 5.4 M/CU MM    Hemoglobin 11.9 (L) 12.5 - 16.0 GM/DL    Hematocrit 37.0 37 - 47 %    MCV 88.7 78 - 100 FL    MCH 28.5 27 - 31 PG    MCHC 32.2 32.0 - 36.0 %    RDW 15.8 (H) 11.7 - 14.9 %    Platelets 046 975 - 535 K/CU MM    MPV 9.1 7.5 - 11.1 FL   Basic Metabolic Panel w/ Reflex to MG    Collection Time: 08/27/20  8:16 AM   Result Value Ref Range    Sodium 138 135 - 145 MMOL/L    Potassium 4.4 3.5 - 5.1 MMOL/L    Chloride 104 99 - 110 mMol/L    CO2 22 21 - 32 MMOL/L    Anion Gap 12 4 - 16    BUN 21 6 - 23 MG/DL    CREATININE 0.8 0.6 - 1.1 MG/DL    Glucose 117 (H) 70 - 99 MG/DL    Calcium 9.1 8.3 - 10.6 MG/DL    GFR Non-African American >60 >60 mL/min/1.73m2    GFR African American >60 >60 mL/min/1.73m2         Medical problems    Patient Active Problem List:     Acute acalculous cholecystitis     Transaminitis     Abdominal pain, epigastric     Non-intractable vomiting with nausea     Elevated liver enzymes     Concussion syndrome     Confusion     Encephalopathy acute     UTI (urinary tract infection)     Persistent atrial fibrillation      ASSESSMENT:  ---------------------    Syncopy-unlikely a seizure-r/o orthostatic hypotension-consult cardiology     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         Electronically signed by Samara Ragland MD on 8/27/2020 at 6:21 PM

## 2020-08-27 NOTE — PROGRESS NOTES
[id number] [place] OF SPEECH/LANGUAGE PATHOLOGY  DAILY PROGRESS NOTE  Rip Fam  8/27/2020  [id number]  Pneumonia due to organism [J18.9]  Encephalopathy acute [G93.40]  Lactic acid acidosis [E87.2]  Altered mental status, unspecified altered mental status type [R41.82]  Allergies   Allergen Reactions    Pcn [Penicillins] Other (See Comments)         Pt was seen this date for dysphagia treatment. IMPRESSION AND RECOMMENDATIONS:   Rip Fam was seen for a bedside swallowing treatment and diet tolerance monitoring. Pt was alert and cooperative throughout assessment. Pt reports lack of appetite and required encouragement to participate in PO trials. She was positioned upright in bed and accepted PO trials of puree, soft/regular solids and thin liquids by cup sips. Oropharyngeal swallow appeared grossly intact characterized by adequate mastication and oral clearance, timely swallow initiation and adequate laryngeal elevation. Clear vocal quality and 0 overt s/s of aspiration were observed with all PO trials given. Recommend continue dysphagia 3 diet/thin liquids with aspiration precautions. No further acute ST needs identified at this time. GOALS (current status in bold):  Short-term Goals  Timeframe for Short-term Goals: length of admission  Goal 1: Pt will tolerate dysphagia III diet/thin liquids with adequate oral manipulation and no s/s aspiration. Goal being met   Goal 2: Pt/caregivers will indicate understanding of all recommendations.  Goal being met         EDUCATION: recommendations/POC discussed with nursing and patient     PAIN RATING (0-10 Scale): denies   Time in/Time out: SLP Individual Minutes  Time In: 7030  Time Out: 1055  Minutes: 20    Visit number: [address] Lozano, Jerem[address], [facility], 8/27/2020

## 2020-08-28 LAB
CULTURE: NORMAL
CULTURE: NORMAL
EKG ATRIAL RATE: 77 BPM
EKG DIAGNOSIS: NORMAL
EKG Q-T INTERVAL: 364 MS
EKG QRS DURATION: 82 MS
EKG QTC CALCULATION (BAZETT): 414 MS
EKG R AXIS: -41 DEGREES
EKG T AXIS: -13 DEGREES
EKG VENTRICULAR RATE: 78 BPM
Lab: NORMAL
Lab: NORMAL
SPECIMEN: NORMAL
SPECIMEN: NORMAL

## 2020-09-02 NOTE — PROGRESS NOTES
Physician Progress Note      Rosamaria Koroma  [facility] #:                  [id number]  :                       1932  ADMIT DATE:       2020 9:40 AM  DISCH DATE:        2020 6:11 AM  RESPONDING  PROVIDER #:        Casey De Jesus MD          QUERY TEXT:    Dr Marcello Velázquez:  Patient admitted with , noted to have atrial fibrillation. If possible, please   document in progress notes and discharge summary further specificity   regarding the type of atrial fibrillation: The medical record reflects the following:  Risk Factors: H/O AFIB  Clinical Indicators: Current EKG showed atrial fibrillation  Treatment: Cardiology consult, Had been on Eliquis held d/t subarachnoid   hemorrhage    Chronic: nonspecific term that could be referring to paroxysmal, persistent,   or permanent  Longstanding persistent: persistent and continuous, lasting > 1 year. Paroxysmal - self-terminating or intermittent; resolves with or without   intervention within 7 days of onset; may recur with various frequency. Persistent - Fails to terminate within 7 days; Often requires meds or   cardioversion to restore to NSR. Permanent - longstanding & persistent; Medication has been ineffective in   restoring NSR &/or cardioversion is contraindicated    Thank you,  Mariah Christopher RN [phone]  Definitions per MS-DRG Training Guide and Quick Reference Guide, Yelitza Heróis [facility] 112 5   Diseases and Disorders of the Circulatory System; 2019; . Software content   from the [employer]?  Advanced CDI Transformation Program  Options provided:  -- Chronic Atrial Fibrillation, unspecified  -- Longstanding Persistent Atrial Fibrillation  -- Permanent Atrial Fibrillation  -- Persistent Atrial Fibrillation  -- Other - I will add my own diagnosis  -- Disagree - Not applicable / Not valid  -- Disagree - Clinically unable to determine / Unknown  -- Refer to Clinical Documentation Reviewer    PROVIDER RESPONSE TEXT:    This patient has permanent atrial fibrillation.     Query created by: [employer] on 8/24/2020 4:07 PM      Electronically signed by:  Patricia Jefferson MD 9/2/2020 7:05 AM

## 2020-09-24 PROBLEM — N39.0 UTI (URINARY TRACT INFECTION): Status: RESOLVED | Noted: 2020-08-25 | Resolved: 2020-09-24

## 2021-05-14 ENCOUNTER — HOSPITAL ENCOUNTER (OUTPATIENT)
Age: 86
Setting detail: SPECIMEN
Discharge: HOME | End: 2021-05-14
Payer: MEDICARE

## 2021-05-14 LAB — TSH HIGH SENSITIVITY: 0.35 UIU/ML (ref 0.27–4.2)

## 2021-05-14 PROCEDURE — 84443 ASSAY THYROID STIM HORMONE: CPT

## 2021-05-14 PROCEDURE — 36415 COLL VENOUS BLD VENIPUNCTURE: CPT

## 2021-07-29 ENCOUNTER — HOSPITAL ENCOUNTER (OUTPATIENT)
Age: 86
Setting detail: SPECIMEN
Discharge: HOME | End: 2021-07-29
Payer: MEDICARE

## 2021-07-29 LAB
ANION GAP SERPL CALCULATED.3IONS-SCNC: 11 MMOL/L (ref 4–16)
BUN BLDV-MCNC: 14 MG/DL (ref 6–23)
CALCIUM SERPL-MCNC: 9.1 MG/DL (ref 8.3–10.6)
CHLORIDE BLD-SCNC: 107 MMOL/L (ref 99–110)
CO2: 24 MMOL/L (ref 21–32)
CREAT SERPL-MCNC: 0.9 MG/DL (ref 0.6–1.1)
GFR AFRICAN AMERICAN: >60 ML/MIN/1.73M2
GFR NON-AFRICAN AMERICAN: 59 ML/MIN/1.73M2
GLUCOSE BLD-MCNC: 83 MG/DL (ref 70–99)
HCT VFR BLD CALC: 37.1 % (ref 37–47)
HEMOGLOBIN: 11.7 GM/DL (ref 12.5–16)
MCH RBC QN AUTO: 28.7 PG (ref 27–31)
MCHC RBC AUTO-ENTMCNC: 31.5 % (ref 32–36)
MCV RBC AUTO: 90.9 FL (ref 78–100)
PDW BLD-RTO: 14.2 % (ref 11.7–14.9)
PLATELET # BLD: 247 K/CU MM (ref 140–440)
PMV BLD AUTO: 9.5 FL (ref 7.5–11.1)
POTASSIUM SERPL-SCNC: 4.4 MMOL/L (ref 3.5–5.1)
RBC # BLD: 4.08 M/CU MM (ref 4.2–5.4)
SODIUM BLD-SCNC: 142 MMOL/L (ref 135–145)
WBC # BLD: 5.4 K/CU MM (ref 4–10.5)

## 2021-07-29 PROCEDURE — 85027 COMPLETE CBC AUTOMATED: CPT

## 2021-07-29 PROCEDURE — 36415 COLL VENOUS BLD VENIPUNCTURE: CPT

## 2021-07-29 PROCEDURE — 80048 BASIC METABOLIC PNL TOTAL CA: CPT

## 2021-09-07 ENCOUNTER — HOSPITAL ENCOUNTER (OUTPATIENT)
Age: 86
Setting detail: SPECIMEN
Discharge: HOME | End: 2021-09-07
Payer: COMMERCIAL

## 2021-09-07 LAB
ALBUMIN SERPL-MCNC: 4.1 GM/DL (ref 3.4–5)
ALP BLD-CCNC: 79 IU/L (ref 40–128)
ALT SERPL-CCNC: <5 U/L (ref 10–40)
ANION GAP SERPL CALCULATED.3IONS-SCNC: 11 MMOL/L (ref 4–16)
AST SERPL-CCNC: 12 IU/L (ref 15–37)
BASOPHILS ABSOLUTE: 0.1 K/CU MM
BASOPHILS RELATIVE PERCENT: 1.1 % (ref 0–1)
BILIRUB SERPL-MCNC: 0.3 MG/DL (ref 0–1)
BUN BLDV-MCNC: 15 MG/DL (ref 6–23)
CALCIUM SERPL-MCNC: 9.5 MG/DL (ref 8.3–10.6)
CHLORIDE BLD-SCNC: 107 MMOL/L (ref 99–110)
CHOLESTEROL: 163 MG/DL
CO2: 25 MMOL/L (ref 21–32)
CREAT SERPL-MCNC: 1 MG/DL (ref 0.6–1.1)
DIFFERENTIAL TYPE: ABNORMAL
EOSINOPHILS ABSOLUTE: 0.2 K/CU MM
EOSINOPHILS RELATIVE PERCENT: 3.2 % (ref 0–3)
GFR AFRICAN AMERICAN: >60 ML/MIN/1.73M2
GFR NON-AFRICAN AMERICAN: 52 ML/MIN/1.73M2
GLUCOSE BLD-MCNC: 123 MG/DL (ref 70–99)
HCT VFR BLD CALC: 41.4 % (ref 37–47)
HDLC SERPL-MCNC: 62 MG/DL
HEMOGLOBIN: 12.9 GM/DL (ref 12.5–16)
IMMATURE NEUTROPHIL %: 0.2 % (ref 0–0.43)
LDL CHOLESTEROL DIRECT: 82 MG/DL
LYMPHOCYTES ABSOLUTE: 1.6 K/CU MM
LYMPHOCYTES RELATIVE PERCENT: 28.6 % (ref 24–44)
MCH RBC QN AUTO: 28.7 PG (ref 27–31)
MCHC RBC AUTO-ENTMCNC: 31.2 % (ref 32–36)
MCV RBC AUTO: 92 FL (ref 78–100)
MONOCYTES ABSOLUTE: 0.5 K/CU MM
MONOCYTES RELATIVE PERCENT: 8.3 % (ref 0–4)
NUCLEATED RBC %: 0 %
PDW BLD-RTO: 13.7 % (ref 11.7–14.9)
PLATELET # BLD: 294 K/CU MM (ref 140–440)
PMV BLD AUTO: 9.7 FL (ref 7.5–11.1)
POTASSIUM SERPL-SCNC: 4.2 MMOL/L (ref 3.5–5.1)
RBC # BLD: 4.5 M/CU MM (ref 4.2–5.4)
SEGMENTED NEUTROPHILS ABSOLUTE COUNT: 3.3 K/CU MM
SEGMENTED NEUTROPHILS RELATIVE PERCENT: 58.6 % (ref 36–66)
SODIUM BLD-SCNC: 143 MMOL/L (ref 135–145)
TOTAL IMMATURE NEUTOROPHIL: 0.01 K/CU MM
TOTAL NUCLEATED RBC: 0 K/CU MM
TOTAL PROTEIN: 6.7 GM/DL (ref 6.4–8.2)
TRIGL SERPL-MCNC: 83 MG/DL
TSH HIGH SENSITIVITY: 0.39 UIU/ML (ref 0.27–4.2)
WBC # BLD: 5.6 K/CU MM (ref 4–10.5)

## 2021-09-07 PROCEDURE — 80061 LIPID PANEL: CPT

## 2021-09-07 PROCEDURE — 80053 COMPREHEN METABOLIC PANEL: CPT

## 2021-09-07 PROCEDURE — 83721 ASSAY OF BLOOD LIPOPROTEIN: CPT

## 2021-09-07 PROCEDURE — 80177 DRUG SCRN QUAN LEVETIRACETAM: CPT

## 2021-09-07 PROCEDURE — 85025 COMPLETE CBC W/AUTO DIFF WBC: CPT

## 2021-09-07 PROCEDURE — 84443 ASSAY THYROID STIM HORMONE: CPT

## 2021-09-07 PROCEDURE — 36415 COLL VENOUS BLD VENIPUNCTURE: CPT

## 2021-09-09 LAB — KEPPRA: 16 UG/ML (ref 12–46)

## 2022-02-18 ENCOUNTER — HOSPITAL ENCOUNTER (OUTPATIENT)
Age: 87
Setting detail: SPECIMEN
Discharge: HOME | End: 2022-02-18
Payer: COMMERCIAL

## 2022-02-18 PROCEDURE — 81001 URINALYSIS AUTO W/SCOPE: CPT

## 2022-02-18 PROCEDURE — 87086 URINE CULTURE/COLONY COUNT: CPT

## 2022-02-18 PROCEDURE — 87186 SC STD MICRODIL/AGAR DIL: CPT

## 2022-02-18 PROCEDURE — 87088 URINE BACTERIA CULTURE: CPT

## 2022-02-19 LAB
BACTERIA: NEGATIVE /HPF
BILIRUBIN URINE: NEGATIVE MG/DL
BLOOD, URINE: ABNORMAL
CLARITY: ABNORMAL
COLOR: YELLOW
GLUCOSE, URINE: NEGATIVE MG/DL
KETONES, URINE: ABNORMAL MG/DL
LEUKOCYTE ESTERASE, URINE: ABNORMAL
MUCUS: ABNORMAL HPF
NITRITE URINE, QUANTITATIVE: POSITIVE
PH, URINE: 6 (ref 5–8)
PROTEIN UA: 30 MG/DL
RBC URINE: 100 /HPF (ref 0–6)
SPECIFIC GRAVITY UA: >1.03 (ref 1–1.03)
SQUAMOUS EPITHELIAL: 10 /HPF
TRICHOMONAS: ABNORMAL /HPF
UROBILINOGEN, URINE: 0.2 MG/DL (ref 0.2–1)
WBC CLUMP: ABNORMAL /HPF
WBC UA: 2261 /HPF (ref 0–5)

## 2022-02-21 LAB
CULTURE: ABNORMAL
CULTURE: ABNORMAL
Lab: ABNORMAL
SPECIMEN: ABNORMAL

## 2022-03-01 ENCOUNTER — HOSPITAL ENCOUNTER (OUTPATIENT)
Age: 87
Setting detail: SPECIMEN
Discharge: HOME | End: 2022-03-01
Payer: COMMERCIAL

## 2022-03-01 LAB
ALBUMIN SERPL-MCNC: 3.7 GM/DL (ref 3.4–5)
ALP BLD-CCNC: 72 IU/L (ref 40–128)
ALT SERPL-CCNC: 5 U/L (ref 10–40)
ANION GAP SERPL CALCULATED.3IONS-SCNC: 10 MMOL/L (ref 4–16)
AST SERPL-CCNC: 13 IU/L (ref 15–37)
BASOPHILS ABSOLUTE: 0.1 K/CU MM
BASOPHILS RELATIVE PERCENT: 0.8 % (ref 0–1)
BILIRUB SERPL-MCNC: 0.3 MG/DL (ref 0–1)
BUN BLDV-MCNC: 21 MG/DL (ref 6–23)
CALCIUM SERPL-MCNC: 9.2 MG/DL (ref 8.3–10.6)
CHLORIDE BLD-SCNC: 104 MMOL/L (ref 99–110)
CHOLESTEROL: 135 MG/DL
CO2: 21 MMOL/L (ref 21–32)
CREAT SERPL-MCNC: 1 MG/DL (ref 0.6–1.1)
DIFFERENTIAL TYPE: ABNORMAL
EOSINOPHILS ABSOLUTE: 0.4 K/CU MM
EOSINOPHILS RELATIVE PERCENT: 5.6 % (ref 0–3)
GFR AFRICAN AMERICAN: >60 ML/MIN/1.73M2
GFR NON-AFRICAN AMERICAN: 52 ML/MIN/1.73M2
GLUCOSE BLD-MCNC: 87 MG/DL (ref 70–99)
HCT VFR BLD CALC: 38.7 % (ref 37–47)
HDLC SERPL-MCNC: 50 MG/DL
HEMOGLOBIN: 11.8 GM/DL (ref 12.5–16)
IMMATURE NEUTROPHIL %: 0.3 % (ref 0–0.43)
LDL CHOLESTEROL CALCULATED: 64 MG/DL
LYMPHOCYTES ABSOLUTE: 1.8 K/CU MM
LYMPHOCYTES RELATIVE PERCENT: 28.5 % (ref 24–44)
MCH RBC QN AUTO: 28.5 PG (ref 27–31)
MCHC RBC AUTO-ENTMCNC: 30.5 % (ref 32–36)
MCV RBC AUTO: 93.5 FL (ref 78–100)
MONOCYTES ABSOLUTE: 0.5 K/CU MM
MONOCYTES RELATIVE PERCENT: 8.2 % (ref 0–4)
NUCLEATED RBC %: 0 %
PDW BLD-RTO: 13 % (ref 11.7–14.9)
PLATELET # BLD: 260 K/CU MM (ref 140–440)
PMV BLD AUTO: 9.6 FL (ref 7.5–11.1)
POTASSIUM SERPL-SCNC: 4.9 MMOL/L (ref 3.5–5.1)
RBC # BLD: 4.14 M/CU MM (ref 4.2–5.4)
SEGMENTED NEUTROPHILS ABSOLUTE COUNT: 3.5 K/CU MM
SEGMENTED NEUTROPHILS RELATIVE PERCENT: 56.6 % (ref 36–66)
SODIUM BLD-SCNC: 135 MMOL/L (ref 135–145)
TOTAL IMMATURE NEUTOROPHIL: 0.02 K/CU MM
TOTAL NUCLEATED RBC: 0 K/CU MM
TOTAL PROTEIN: 6.2 GM/DL (ref 6.4–8.2)
TRIGL SERPL-MCNC: 107 MG/DL
TSH HIGH SENSITIVITY: 0.71 UIU/ML (ref 0.27–4.2)
WBC # BLD: 6.3 K/CU MM (ref 4–10.5)

## 2022-03-01 PROCEDURE — 36415 COLL VENOUS BLD VENIPUNCTURE: CPT

## 2022-03-01 PROCEDURE — 80177 DRUG SCRN QUAN LEVETIRACETAM: CPT

## 2022-03-01 PROCEDURE — 80061 LIPID PANEL: CPT

## 2022-03-01 PROCEDURE — 84443 ASSAY THYROID STIM HORMONE: CPT

## 2022-03-01 PROCEDURE — 85025 COMPLETE CBC W/AUTO DIFF WBC: CPT

## 2022-03-01 PROCEDURE — 80053 COMPREHEN METABOLIC PANEL: CPT

## 2022-03-04 LAB — KEPPRA: 56 UG/ML (ref 10–40)

## 2022-03-21 ENCOUNTER — HOSPITAL ENCOUNTER (OUTPATIENT)
Age: 87
Setting detail: SPECIMEN
Discharge: HOME | End: 2022-03-21
Payer: COMMERCIAL

## 2022-03-21 PROCEDURE — 36415 COLL VENOUS BLD VENIPUNCTURE: CPT

## 2022-03-21 PROCEDURE — 80177 DRUG SCRN QUAN LEVETIRACETAM: CPT

## 2022-03-23 LAB — KEPPRA: 43 UG/ML (ref 10–40)

## 2022-04-04 ENCOUNTER — HOSPITAL ENCOUNTER (OUTPATIENT)
Age: 87
Setting detail: SPECIMEN
Discharge: HOME | End: 2022-04-04
Payer: COMMERCIAL

## 2022-04-04 LAB
BACTERIA: ABNORMAL /HPF
BILIRUBIN URINE: NEGATIVE MG/DL
BLOOD, URINE: NEGATIVE
CLARITY: CLEAR
COLOR: YELLOW
GLUCOSE, URINE: NEGATIVE MG/DL
KETONES, URINE: NEGATIVE MG/DL
LEUKOCYTE ESTERASE, URINE: NEGATIVE
NITRITE URINE, QUANTITATIVE: NEGATIVE
PH, URINE: 6 (ref 5–8)
PROTEIN UA: NEGATIVE MG/DL
RBC URINE: <1 /HPF (ref 0–6)
SPECIFIC GRAVITY UA: 1.02 (ref 1–1.03)
SQUAMOUS EPITHELIAL: 1 /HPF
TRICHOMONAS: ABNORMAL /HPF
UROBILINOGEN, URINE: 0.2 MG/DL (ref 0.2–1)
WBC UA: <1 /HPF (ref 0–5)

## 2022-04-04 PROCEDURE — 81001 URINALYSIS AUTO W/SCOPE: CPT

## 2022-04-04 PROCEDURE — 87086 URINE CULTURE/COLONY COUNT: CPT

## 2022-04-04 PROCEDURE — 87186 SC STD MICRODIL/AGAR DIL: CPT

## 2022-04-04 PROCEDURE — 87088 URINE BACTERIA CULTURE: CPT

## 2022-04-06 LAB
CULTURE: ABNORMAL
CULTURE: ABNORMAL
Lab: ABNORMAL
SPECIMEN: ABNORMAL

## 2022-04-19 ENCOUNTER — HOSPITAL ENCOUNTER (OUTPATIENT)
Age: 87
Setting detail: SPECIMEN
Discharge: HOME | End: 2022-04-19
Payer: COMMERCIAL

## 2022-04-19 LAB
RAPID INFLUENZA  B AGN: NEGATIVE
RAPID INFLUENZA A AGN: NEGATIVE

## 2022-04-19 PROCEDURE — 87804 INFLUENZA ASSAY W/OPTIC: CPT

## 2022-09-06 ENCOUNTER — HOSPITAL ENCOUNTER (OUTPATIENT)
Age: 87
Setting detail: SPECIMEN
Discharge: HOME | End: 2022-09-06
Payer: COMMERCIAL

## 2022-09-06 LAB
ALBUMIN SERPL-MCNC: 3.8 GM/DL (ref 3.4–5)
ALP BLD-CCNC: 68 IU/L (ref 40–128)
ALT SERPL-CCNC: <5 U/L (ref 10–40)
ANION GAP SERPL CALCULATED.3IONS-SCNC: 10 MMOL/L (ref 4–16)
AST SERPL-CCNC: 12 IU/L (ref 15–37)
BASOPHILS ABSOLUTE: 0.1 K/CU MM
BASOPHILS RELATIVE PERCENT: 0.9 % (ref 0–1)
BILIRUB SERPL-MCNC: 0.3 MG/DL (ref 0–1)
BUN BLDV-MCNC: 24 MG/DL (ref 6–23)
CALCIUM SERPL-MCNC: 9.1 MG/DL (ref 8.3–10.6)
CHLORIDE BLD-SCNC: 108 MMOL/L (ref 99–110)
CHOLESTEROL: 155 MG/DL
CO2: 23 MMOL/L (ref 21–32)
CREAT SERPL-MCNC: 0.9 MG/DL (ref 0.6–1.1)
DIFFERENTIAL TYPE: ABNORMAL
EOSINOPHILS ABSOLUTE: 0.3 K/CU MM
EOSINOPHILS RELATIVE PERCENT: 3.8 % (ref 0–3)
GFR AFRICAN AMERICAN: >60 ML/MIN/1.73M2
GFR NON-AFRICAN AMERICAN: 59 ML/MIN/1.73M2
GLUCOSE BLD-MCNC: 88 MG/DL (ref 70–99)
HCT VFR BLD CALC: 36.3 % (ref 37–47)
HDLC SERPL-MCNC: 48 MG/DL
HEMOGLOBIN: 11 GM/DL (ref 12.5–16)
IMMATURE NEUTROPHIL %: 0.3 % (ref 0–0.43)
LDL CHOLESTEROL CALCULATED: 88 MG/DL
LYMPHOCYTES ABSOLUTE: 1.6 K/CU MM
LYMPHOCYTES RELATIVE PERCENT: 24.2 % (ref 24–44)
MCH RBC QN AUTO: 27.6 PG (ref 27–31)
MCHC RBC AUTO-ENTMCNC: 30.3 % (ref 32–36)
MCV RBC AUTO: 91 FL (ref 78–100)
MONOCYTES ABSOLUTE: 0.6 K/CU MM
MONOCYTES RELATIVE PERCENT: 8.7 % (ref 0–4)
NUCLEATED RBC %: 0 %
PDW BLD-RTO: 13.2 % (ref 11.7–14.9)
PLATELET # BLD: 286 K/CU MM (ref 140–440)
PMV BLD AUTO: 9.5 FL (ref 7.5–11.1)
POTASSIUM SERPL-SCNC: 4.6 MMOL/L (ref 3.5–5.1)
RBC # BLD: 3.99 M/CU MM (ref 4.2–5.4)
SEGMENTED NEUTROPHILS ABSOLUTE COUNT: 4.2 K/CU MM
SEGMENTED NEUTROPHILS RELATIVE PERCENT: 62.1 % (ref 36–66)
SODIUM BLD-SCNC: 141 MMOL/L (ref 135–145)
TOTAL IMMATURE NEUTOROPHIL: 0.02 K/CU MM
TOTAL NUCLEATED RBC: 0 K/CU MM
TOTAL PROTEIN: 6.1 GM/DL (ref 6.4–8.2)
TRIGL SERPL-MCNC: 94 MG/DL
TSH HIGH SENSITIVITY: 0.53 UIU/ML (ref 0.27–4.2)
WBC # BLD: 6.8 K/CU MM (ref 4–10.5)

## 2022-09-06 PROCEDURE — 36415 COLL VENOUS BLD VENIPUNCTURE: CPT

## 2022-09-06 PROCEDURE — 85025 COMPLETE CBC W/AUTO DIFF WBC: CPT

## 2022-09-06 PROCEDURE — 80061 LIPID PANEL: CPT

## 2022-09-06 PROCEDURE — 80053 COMPREHEN METABOLIC PANEL: CPT

## 2022-09-06 PROCEDURE — 84443 ASSAY THYROID STIM HORMONE: CPT

## 2022-12-12 ENCOUNTER — HOSPITAL ENCOUNTER (OUTPATIENT)
Age: 87
Setting detail: SPECIMEN
Discharge: HOME | End: 2022-12-12

## 2022-12-12 LAB
ANION GAP SERPL CALCULATED.3IONS-SCNC: 13 MMOL/L (ref 4–16)
BUN BLDV-MCNC: 25 MG/DL (ref 6–23)
CALCIUM SERPL-MCNC: 9.3 MG/DL (ref 8.3–10.6)
CHLORIDE BLD-SCNC: 101 MMOL/L (ref 99–110)
CO2: 24 MMOL/L (ref 21–32)
CREAT SERPL-MCNC: 1 MG/DL (ref 0.6–1.1)
GFR SERPL CREATININE-BSD FRML MDRD: 54 ML/MIN/1.73M2
GLUCOSE BLD-MCNC: 177 MG/DL (ref 70–99)
HCT VFR BLD CALC: 38 % (ref 37–47)
HEMOGLOBIN: 11.3 GM/DL (ref 12.5–16)
MCH RBC QN AUTO: 27 PG (ref 27–31)
MCHC RBC AUTO-ENTMCNC: 29.7 % (ref 32–36)
MCV RBC AUTO: 90.7 FL (ref 78–100)
PDW BLD-RTO: 13.1 % (ref 11.7–14.9)
PLATELET # BLD: 341 K/CU MM (ref 140–440)
PMV BLD AUTO: 9.5 FL (ref 7.5–11.1)
POTASSIUM SERPL-SCNC: 4.4 MMOL/L (ref 3.5–5.1)
RBC # BLD: 4.19 M/CU MM (ref 4.2–5.4)
SODIUM BLD-SCNC: 138 MMOL/L (ref 135–145)
WBC # BLD: 13.8 K/CU MM (ref 4–10.5)

## 2022-12-12 PROCEDURE — 80048 BASIC METABOLIC PNL TOTAL CA: CPT

## 2022-12-12 PROCEDURE — 85027 COMPLETE CBC AUTOMATED: CPT

## 2022-12-12 PROCEDURE — 36415 COLL VENOUS BLD VENIPUNCTURE: CPT

## 2022-12-12 PROCEDURE — 9900360100 HC STAT COLLECTION FEE SNF

## 2022-12-14 ENCOUNTER — HOSPITAL ENCOUNTER (OUTPATIENT)
Age: 87
Setting detail: SPECIMEN
Discharge: HOME | End: 2022-12-14
Payer: MEDICAID

## 2022-12-14 LAB
BACTERIA: ABNORMAL /HPF
BILIRUBIN URINE: NEGATIVE MG/DL
BLOOD, URINE: NEGATIVE
CLARITY: CLEAR
COLOR: YELLOW
GLUCOSE, URINE: NEGATIVE MG/DL
KETONES, URINE: NEGATIVE MG/DL
LEUKOCYTE ESTERASE, URINE: ABNORMAL
NITRITE URINE, QUANTITATIVE: POSITIVE
PH, URINE: 7.5 (ref 5–8)
PROTEIN UA: ABNORMAL MG/DL
RBC URINE: 4 /HPF (ref 0–6)
SPECIFIC GRAVITY UA: 1.01 (ref 1–1.03)
SQUAMOUS EPITHELIAL: <1 /HPF
TRICHOMONAS: ABNORMAL /HPF
UROBILINOGEN, URINE: 1 MG/DL (ref 0.2–1)
WBC UA: 50 /HPF (ref 0–5)
YEAST: ABNORMAL /HPF

## 2022-12-14 PROCEDURE — 81001 URINALYSIS AUTO W/SCOPE: CPT

## 2022-12-14 PROCEDURE — 87077 CULTURE AEROBIC IDENTIFY: CPT

## 2022-12-14 PROCEDURE — 87186 SC STD MICRODIL/AGAR DIL: CPT

## 2022-12-14 PROCEDURE — 87086 URINE CULTURE/COLONY COUNT: CPT

## 2022-12-16 LAB
CULTURE: ABNORMAL
CULTURE: ABNORMAL
Lab: ABNORMAL
SPECIMEN: ABNORMAL

## 2022-12-20 ENCOUNTER — HOSPITAL ENCOUNTER (OUTPATIENT)
Age: 87
Setting detail: SPECIMEN
Discharge: HOME | End: 2022-12-20
Payer: MEDICAID

## 2022-12-20 LAB
HCT VFR BLD CALC: 33 % (ref 37–47)
HEMOGLOBIN: 10.4 GM/DL (ref 12.5–16)
MCH RBC QN AUTO: 27.7 PG (ref 27–31)
MCHC RBC AUTO-ENTMCNC: 31.5 % (ref 32–36)
MCV RBC AUTO: 88 FL (ref 78–100)
PDW BLD-RTO: 13.1 % (ref 11.7–14.9)
PLATELET # BLD: 335 K/CU MM (ref 140–440)
PMV BLD AUTO: 9.2 FL (ref 7.5–11.1)
RBC # BLD: 3.75 M/CU MM (ref 4.2–5.4)
WBC # BLD: 7.1 K/CU MM (ref 4–10.5)

## 2022-12-20 PROCEDURE — 85027 COMPLETE CBC AUTOMATED: CPT

## 2022-12-20 PROCEDURE — 36415 COLL VENOUS BLD VENIPUNCTURE: CPT

## 2022-12-29 ENCOUNTER — HOSPITAL ENCOUNTER (OUTPATIENT)
Age: 87
Setting detail: SPECIMEN
Discharge: HOME | End: 2022-12-29
Payer: MEDICAID

## 2022-12-29 PROCEDURE — 87070 CULTURE OTHR SPECIMN AEROBIC: CPT

## 2022-12-29 PROCEDURE — 87075 CULTR BACTERIA EXCEPT BLOOD: CPT

## 2023-01-01 LAB
CULTURE: NORMAL
Lab: NORMAL
SPECIMEN: NORMAL

## 2023-03-07 ENCOUNTER — HOSPITAL ENCOUNTER (OUTPATIENT)
Age: 88
Setting detail: SPECIMEN
Discharge: HOME | End: 2023-03-07
Payer: MEDICAID

## 2023-03-07 LAB
ALBUMIN SERPL-MCNC: 4.1 GM/DL (ref 3.4–5)
ALP BLD-CCNC: 63 IU/L (ref 40–128)
ALT SERPL-CCNC: 8 U/L (ref 10–40)
ANION GAP SERPL CALCULATED.3IONS-SCNC: 13 MMOL/L (ref 4–16)
AST SERPL-CCNC: 15 IU/L (ref 15–37)
BASOPHILS ABSOLUTE: 0.1 K/CU MM
BASOPHILS RELATIVE PERCENT: 0.7 % (ref 0–1)
BILIRUB SERPL-MCNC: 0.4 MG/DL (ref 0–1)
BUN SERPL-MCNC: 18 MG/DL (ref 6–23)
CALCIUM SERPL-MCNC: 9.4 MG/DL (ref 8.3–10.6)
CHLORIDE BLD-SCNC: 103 MMOL/L (ref 99–110)
CHOLEST SERPL-MCNC: 191 MG/DL
CO2: 26 MMOL/L (ref 21–32)
CREAT SERPL-MCNC: 1 MG/DL (ref 0.6–1.1)
DIFFERENTIAL TYPE: ABNORMAL
EOSINOPHILS ABSOLUTE: 0.1 K/CU MM
EOSINOPHILS RELATIVE PERCENT: 1.8 % (ref 0–3)
GFR SERPL CREATININE-BSD FRML MDRD: 54 ML/MIN/1.73M2
GLUCOSE SERPL-MCNC: 107 MG/DL (ref 70–99)
HCT VFR BLD CALC: 42 % (ref 37–47)
HDLC SERPL-MCNC: 55 MG/DL
HEMOGLOBIN: 12.9 GM/DL (ref 12.5–16)
IMMATURE NEUTROPHIL %: 0.3 % (ref 0–0.43)
LDLC SERPL CALC-MCNC: 112 MG/DL
LYMPHOCYTES ABSOLUTE: 2.4 K/CU MM
LYMPHOCYTES RELATIVE PERCENT: 34.3 % (ref 24–44)
MCH RBC QN AUTO: 27.9 PG (ref 27–31)
MCHC RBC AUTO-ENTMCNC: 30.7 % (ref 32–36)
MCV RBC AUTO: 90.9 FL (ref 78–100)
MONOCYTES ABSOLUTE: 0.5 K/CU MM
MONOCYTES RELATIVE PERCENT: 7.7 % (ref 0–4)
NUCLEATED RBC %: 0 %
PDW BLD-RTO: 14.6 % (ref 11.7–14.9)
PLATELET # BLD: 308 K/CU MM (ref 140–440)
PMV BLD AUTO: 9.8 FL (ref 7.5–11.1)
POTASSIUM SERPL-SCNC: 3.8 MMOL/L (ref 3.5–5.1)
RBC # BLD: 4.62 M/CU MM (ref 4.2–5.4)
SEGMENTED NEUTROPHILS ABSOLUTE COUNT: 3.8 K/CU MM
SEGMENTED NEUTROPHILS RELATIVE PERCENT: 55.2 % (ref 36–66)
SODIUM BLD-SCNC: 142 MMOL/L (ref 135–145)
TOTAL IMMATURE NEUTOROPHIL: 0.02 K/CU MM
TOTAL NUCLEATED RBC: 0 K/CU MM
TOTAL PROTEIN: 6.8 GM/DL (ref 6.4–8.2)
TRIGL SERPL-MCNC: 119 MG/DL
TSH SERPL DL<=0.005 MIU/L-ACNC: 2.48 UIU/ML (ref 0.27–4.2)
WBC # BLD: 6.9 K/CU MM (ref 4–10.5)

## 2023-03-07 PROCEDURE — 36415 COLL VENOUS BLD VENIPUNCTURE: CPT

## 2023-03-07 PROCEDURE — 85025 COMPLETE CBC W/AUTO DIFF WBC: CPT

## 2023-03-07 PROCEDURE — 80053 COMPREHEN METABOLIC PANEL: CPT

## 2023-03-07 PROCEDURE — 80061 LIPID PANEL: CPT

## 2023-03-07 PROCEDURE — 84443 ASSAY THYROID STIM HORMONE: CPT

## 2023-07-07 ENCOUNTER — HOSPITAL ENCOUNTER (OUTPATIENT)
Age: 88
Setting detail: SPECIMEN
Discharge: HOME | End: 2023-07-07
Payer: MEDICAID

## 2023-07-07 LAB
ANION GAP SERPL CALCULATED.3IONS-SCNC: 11 MMOL/L (ref 4–16)
BUN SERPL-MCNC: 24 MG/DL (ref 6–23)
CALCIUM SERPL-MCNC: 8.5 MG/DL (ref 8.3–10.6)
CHLORIDE BLD-SCNC: 104 MMOL/L (ref 99–110)
CO2: 25 MMOL/L (ref 21–32)
CREAT SERPL-MCNC: 1 MG/DL (ref 0.6–1.1)
GFR SERPL CREATININE-BSD FRML MDRD: 54 ML/MIN/1.73M2
GLUCOSE SERPL-MCNC: 83 MG/DL (ref 70–99)
HCT VFR BLD CALC: 40 % (ref 37–47)
HEMOGLOBIN: 12.4 GM/DL (ref 12.5–16)
MCH RBC QN AUTO: 28.5 PG (ref 27–31)
MCHC RBC AUTO-ENTMCNC: 31 % (ref 32–36)
MCV RBC AUTO: 92 FL (ref 78–100)
PDW BLD-RTO: 13.7 % (ref 11.7–14.9)
PLATELET # BLD: 259 K/CU MM (ref 140–440)
PMV BLD AUTO: 9.8 FL (ref 7.5–11.1)
POTASSIUM SERPL-SCNC: 4.2 MMOL/L (ref 3.5–5.1)
RBC # BLD: 4.35 M/CU MM (ref 4.2–5.4)
SODIUM BLD-SCNC: 140 MMOL/L (ref 135–145)
WBC # BLD: 6.1 K/CU MM (ref 4–10.5)

## 2023-07-07 PROCEDURE — 36415 COLL VENOUS BLD VENIPUNCTURE: CPT

## 2023-07-07 PROCEDURE — 80048 BASIC METABOLIC PNL TOTAL CA: CPT

## 2023-07-07 PROCEDURE — 85027 COMPLETE CBC AUTOMATED: CPT

## 2023-07-17 ENCOUNTER — HOSPITAL ENCOUNTER (OUTPATIENT)
Age: 88
Setting detail: SPECIMEN
Discharge: HOME | End: 2023-07-17
Payer: MEDICARE

## 2023-07-17 PROCEDURE — 80177 DRUG SCRN QUAN LEVETIRACETAM: CPT

## 2023-07-17 PROCEDURE — 36415 COLL VENOUS BLD VENIPUNCTURE: CPT

## 2023-07-18 LAB — LEVETIRACETAM SERPL-MCNC: 55 UG/ML (ref 10–40)

## 2023-07-21 ENCOUNTER — HOSPITAL ENCOUNTER (OUTPATIENT)
Age: 88
Setting detail: SPECIMEN
Discharge: HOME | End: 2023-07-21
Payer: MEDICARE

## 2023-07-21 PROCEDURE — 36415 COLL VENOUS BLD VENIPUNCTURE: CPT

## 2023-07-21 PROCEDURE — 80177 DRUG SCRN QUAN LEVETIRACETAM: CPT

## 2023-07-22 LAB — LEVETIRACETAM SERPL-MCNC: 33 UG/ML (ref 10–40)

## 2023-07-24 ENCOUNTER — HOSPITAL ENCOUNTER (OUTPATIENT)
Age: 88
Setting detail: SPECIMEN
Discharge: HOME | End: 2023-07-24
Payer: MEDICARE

## 2023-07-24 PROCEDURE — 80177 DRUG SCRN QUAN LEVETIRACETAM: CPT

## 2023-07-24 PROCEDURE — 36415 COLL VENOUS BLD VENIPUNCTURE: CPT

## 2023-07-25 LAB — LEVETIRACETAM SERPL-MCNC: 26 UG/ML (ref 10–40)

## 2023-09-01 ENCOUNTER — HOSPITAL ENCOUNTER (OUTPATIENT)
Age: 88
Setting detail: SPECIMEN
Discharge: HOME | End: 2023-09-01
Payer: MEDICARE

## 2023-09-01 LAB
BASOPHILS ABSOLUTE: 0 K/CU MM
BASOPHILS RELATIVE PERCENT: 0.7 % (ref 0–1)
DIFFERENTIAL TYPE: ABNORMAL
EOSINOPHILS ABSOLUTE: 0.1 K/CU MM
EOSINOPHILS RELATIVE PERCENT: 2.3 % (ref 0–3)
HCT VFR BLD CALC: 42.5 % (ref 37–47)
HEMOGLOBIN: 12.2 GM/DL (ref 12.5–16)
IMMATURE NEUTROPHIL %: 0.4 % (ref 0–0.43)
LYMPHOCYTES ABSOLUTE: 2.1 K/CU MM
LYMPHOCYTES RELATIVE PERCENT: 37.2 % (ref 24–44)
MCH RBC QN AUTO: 28.5 PG (ref 27–31)
MCHC RBC AUTO-ENTMCNC: 28.7 % (ref 32–36)
MCV RBC AUTO: 99.3 FL (ref 78–100)
MONOCYTES ABSOLUTE: 0.6 K/CU MM
MONOCYTES RELATIVE PERCENT: 10.2 % (ref 0–4)
NUCLEATED RBC %: 0 %
PDW BLD-RTO: 13.7 % (ref 11.7–14.9)
PLATELET # BLD: 272 K/CU MM (ref 140–440)
PMV BLD AUTO: 9.9 FL (ref 7.5–11.1)
RBC # BLD: 4.28 M/CU MM (ref 4.2–5.4)
REASON FOR REJECTION: NORMAL
REJECTED TEST: NORMAL
SEGMENTED NEUTROPHILS ABSOLUTE COUNT: 2.8 K/CU MM
SEGMENTED NEUTROPHILS RELATIVE PERCENT: 49.2 % (ref 36–66)
TOTAL IMMATURE NEUTOROPHIL: 0.02 K/CU MM
TOTAL NUCLEATED RBC: 0 K/CU MM
TSH SERPL DL<=0.005 MIU/L-ACNC: 2.54 UIU/ML (ref 0.27–4.2)
WBC # BLD: 5.6 K/CU MM (ref 4–10.5)

## 2023-09-01 PROCEDURE — 85025 COMPLETE CBC W/AUTO DIFF WBC: CPT

## 2023-09-01 PROCEDURE — 36415 COLL VENOUS BLD VENIPUNCTURE: CPT

## 2023-09-01 PROCEDURE — 84443 ASSAY THYROID STIM HORMONE: CPT

## 2023-09-01 PROCEDURE — 80061 LIPID PANEL: CPT

## 2023-09-01 PROCEDURE — 80053 COMPREHEN METABOLIC PANEL: CPT

## 2023-09-05 ENCOUNTER — HOSPITAL ENCOUNTER (OUTPATIENT)
Age: 88
Setting detail: SPECIMEN
Discharge: HOME | End: 2023-09-05
Payer: MEDICARE

## 2023-09-05 LAB
ALBUMIN SERPL-MCNC: 3.4 GM/DL (ref 3.4–5)
ALP BLD-CCNC: 63 IU/L (ref 40–128)
ALT SERPL-CCNC: 6 U/L (ref 10–40)
ANION GAP SERPL CALCULATED.3IONS-SCNC: 10 MMOL/L (ref 4–16)
AST SERPL-CCNC: 13 IU/L (ref 15–37)
BILIRUB SERPL-MCNC: 0.4 MG/DL (ref 0–1)
BUN SERPL-MCNC: 20 MG/DL (ref 6–23)
CALCIUM SERPL-MCNC: 8.6 MG/DL (ref 8.3–10.6)
CHLORIDE BLD-SCNC: 106 MMOL/L (ref 99–110)
CHOLEST SERPL-MCNC: 153 MG/DL
CO2: 22 MMOL/L (ref 21–32)
CREAT SERPL-MCNC: 1 MG/DL (ref 0.6–1.1)
GFR SERPL CREATININE-BSD FRML MDRD: 54 ML/MIN/1.73M2
GLUCOSE SERPL-MCNC: 85 MG/DL (ref 70–99)
HDLC SERPL-MCNC: 40 MG/DL
LDLC SERPL CALC-MCNC: 88 MG/DL
POTASSIUM SERPL-SCNC: 4.4 MMOL/L (ref 3.5–5.1)
SODIUM BLD-SCNC: 138 MMOL/L (ref 135–145)
TOTAL PROTEIN: 5.5 GM/DL (ref 6.4–8.2)
TRIGL SERPL-MCNC: 123 MG/DL

## 2023-09-05 PROCEDURE — 36415 COLL VENOUS BLD VENIPUNCTURE: CPT

## 2023-09-05 PROCEDURE — 80061 LIPID PANEL: CPT

## 2023-09-05 PROCEDURE — 80053 COMPREHEN METABOLIC PANEL: CPT

## 2023-12-26 ENCOUNTER — HOSPITAL ENCOUNTER (OUTPATIENT)
Age: 88
Setting detail: SPECIMEN
Discharge: HOME | End: 2023-12-26
Payer: MEDICARE

## 2023-12-26 LAB
ANION GAP SERPL CALCULATED.3IONS-SCNC: 11 MMOL/L (ref 7–16)
BUN SERPL-MCNC: 22 MG/DL (ref 6–23)
CALCIUM SERPL-MCNC: 8.2 MG/DL (ref 8.3–10.6)
CHLORIDE BLD-SCNC: 115 MMOL/L (ref 99–110)
CO2: 22 MMOL/L (ref 21–32)
CREAT SERPL-MCNC: 0.8 MG/DL (ref 0.6–1.1)
GFR SERPL CREATININE-BSD FRML MDRD: >60 ML/MIN/1.73M2
GLUCOSE SERPL-MCNC: 88 MG/DL (ref 70–99)
HCT VFR BLD CALC: 34 % (ref 37–47)
HEMOGLOBIN: 10.1 GM/DL (ref 12.5–16)
MCH RBC QN AUTO: 27.8 PG (ref 27–31)
MCHC RBC AUTO-ENTMCNC: 29.7 % (ref 32–36)
MCV RBC AUTO: 93.7 FL (ref 78–100)
PDW BLD-RTO: 14.7 % (ref 11.7–14.9)
PLATELET # BLD: 200 K/CU MM (ref 140–440)
PMV BLD AUTO: 10.7 FL (ref 7.5–11.1)
POTASSIUM SERPL-SCNC: 4 MMOL/L (ref 3.5–5.1)
RBC # BLD: 3.63 M/CU MM (ref 4.2–5.4)
SODIUM BLD-SCNC: 148 MMOL/L (ref 135–145)
WBC # BLD: 6.8 K/CU MM (ref 4–10.5)

## 2023-12-26 PROCEDURE — 36415 COLL VENOUS BLD VENIPUNCTURE: CPT

## 2023-12-26 PROCEDURE — 85027 COMPLETE CBC AUTOMATED: CPT

## 2023-12-26 PROCEDURE — 80177 DRUG SCRN QUAN LEVETIRACETAM: CPT

## 2023-12-26 PROCEDURE — 80048 BASIC METABOLIC PNL TOTAL CA: CPT

## 2023-12-27 LAB — LEVETIRACETAM SERPL-MCNC: 33 UG/ML (ref 10–40)

## 2024-01-18 ENCOUNTER — HOSPITAL ENCOUNTER (OUTPATIENT)
Age: 89
Setting detail: SPECIMEN
Discharge: HOME | End: 2024-01-18
Payer: MEDICARE

## 2024-01-18 LAB
ALBUMIN SERPL-MCNC: 3.8 GM/DL (ref 3.4–5)
ALP BLD-CCNC: 198 IU/L (ref 40–129)
ALT SERPL-CCNC: 12 U/L (ref 10–40)
ANION GAP SERPL CALCULATED.3IONS-SCNC: 13 MMOL/L (ref 7–16)
AST SERPL-CCNC: 20 IU/L (ref 15–37)
BILIRUB SERPL-MCNC: 0.5 MG/DL (ref 0–1)
BUN SERPL-MCNC: 23 MG/DL (ref 6–23)
CALCIUM SERPL-MCNC: 9 MG/DL (ref 8.3–10.6)
CHLORIDE BLD-SCNC: 118 MMOL/L (ref 99–110)
CO2: 22 MMOL/L (ref 21–32)
CREAT SERPL-MCNC: 0.9 MG/DL (ref 0.6–1.1)
GFR SERPL CREATININE-BSD FRML MDRD: >60 ML/MIN/1.73M2
GLUCOSE SERPL-MCNC: 100 MG/DL (ref 70–99)
HCT VFR BLD CALC: 41.4 % (ref 37–47)
HEMOGLOBIN: 12.6 GM/DL (ref 12.5–16)
MCH RBC QN AUTO: 27.8 PG (ref 27–31)
MCHC RBC AUTO-ENTMCNC: 30.4 % (ref 32–36)
MCV RBC AUTO: 91.4 FL (ref 78–100)
PDW BLD-RTO: 15.5 % (ref 11.7–14.9)
PLATELET # BLD: 264 K/CU MM (ref 140–440)
PMV BLD AUTO: 10.9 FL (ref 7.5–11.1)
POTASSIUM SERPL-SCNC: 4.4 MMOL/L (ref 3.5–5.1)
RBC # BLD: 4.53 M/CU MM (ref 4.2–5.4)
SODIUM BLD-SCNC: 153 MMOL/L (ref 135–145)
TOTAL PROTEIN: 6.5 GM/DL (ref 6.4–8.2)
WBC # BLD: 6.1 K/CU MM (ref 4–10.5)

## 2024-01-18 PROCEDURE — 80053 COMPREHEN METABOLIC PANEL: CPT

## 2024-01-18 PROCEDURE — 85027 COMPLETE CBC AUTOMATED: CPT

## 2024-01-18 PROCEDURE — 36415 COLL VENOUS BLD VENIPUNCTURE: CPT

## 2024-01-19 ENCOUNTER — HOSPITAL ENCOUNTER (OUTPATIENT)
Age: 89
Setting detail: SPECIMEN
Discharge: HOME | End: 2024-01-19
Payer: MEDICARE

## 2024-01-19 LAB
ANION GAP SERPL CALCULATED.3IONS-SCNC: 12 MMOL/L (ref 7–16)
BUN SERPL-MCNC: 20 MG/DL (ref 6–23)
CALCIUM SERPL-MCNC: 8.6 MG/DL (ref 8.3–10.6)
CHLORIDE BLD-SCNC: 119 MMOL/L (ref 99–110)
CO2: 21 MMOL/L (ref 21–32)
CREAT SERPL-MCNC: 0.8 MG/DL (ref 0.6–1.1)
GFR SERPL CREATININE-BSD FRML MDRD: >60 ML/MIN/1.73M2
GLUCOSE SERPL-MCNC: 91 MG/DL (ref 70–99)
POTASSIUM SERPL-SCNC: 4.2 MMOL/L (ref 3.5–5.1)
SODIUM BLD-SCNC: 152 MMOL/L (ref 135–145)

## 2024-01-19 PROCEDURE — 36415 COLL VENOUS BLD VENIPUNCTURE: CPT

## 2024-01-19 PROCEDURE — 80048 BASIC METABOLIC PNL TOTAL CA: CPT

## 2024-01-24 ENCOUNTER — HOSPITAL ENCOUNTER (OUTPATIENT)
Age: 89
Setting detail: SPECIMEN
Discharge: HOME | End: 2024-01-24
Payer: MEDICARE

## 2024-01-24 PROCEDURE — 80053 COMPREHEN METABOLIC PANEL: CPT

## 2024-01-25 ENCOUNTER — HOSPITAL ENCOUNTER (OUTPATIENT)
Age: 89
Setting detail: SPECIMEN
Discharge: HOME | End: 2024-01-25
Payer: MEDICARE

## 2024-01-25 LAB
ALBUMIN SERPL-MCNC: 3.2 GM/DL (ref 3.4–5)
ALP BLD-CCNC: 141 IU/L (ref 40–128)
ALT SERPL-CCNC: 8 U/L (ref 10–40)
ANION GAP SERPL CALCULATED.3IONS-SCNC: 10 MMOL/L (ref 7–16)
AST SERPL-CCNC: 11 IU/L (ref 15–37)
BILIRUB SERPL-MCNC: 0.4 MG/DL (ref 0–1)
BUN SERPL-MCNC: 22 MG/DL (ref 6–23)
CALCIUM SERPL-MCNC: 8.4 MG/DL (ref 8.3–10.6)
CHLORIDE BLD-SCNC: 117 MMOL/L (ref 99–110)
CO2: 24 MMOL/L (ref 21–32)
CREAT SERPL-MCNC: 0.8 MG/DL (ref 0.6–1.1)
GFR SERPL CREATININE-BSD FRML MDRD: >60 ML/MIN/1.73M2
GLUCOSE SERPL-MCNC: 78 MG/DL (ref 70–99)
HCT VFR BLD CALC: 39 % (ref 37–47)
HEMOGLOBIN: 11.2 GM/DL (ref 12.5–16)
MCH RBC QN AUTO: 27.3 PG (ref 27–31)
MCHC RBC AUTO-ENTMCNC: 28.7 % (ref 32–36)
MCV RBC AUTO: 95.1 FL (ref 78–100)
PDW BLD-RTO: 15.6 % (ref 11.7–14.9)
PLATELET # BLD: 192 K/CU MM (ref 140–440)
PMV BLD AUTO: 11.1 FL (ref 7.5–11.1)
POTASSIUM SERPL-SCNC: 3.5 MMOL/L (ref 3.5–5.1)
RBC # BLD: 4.1 M/CU MM (ref 4.2–5.4)
SODIUM BLD-SCNC: 151 MMOL/L (ref 135–145)
TOTAL PROTEIN: 5.6 GM/DL (ref 6.4–8.2)
WBC # BLD: 6.2 K/CU MM (ref 4–10.5)

## 2024-01-25 PROCEDURE — 85027 COMPLETE CBC AUTOMATED: CPT

## 2024-01-25 PROCEDURE — 36415 COLL VENOUS BLD VENIPUNCTURE: CPT

## 2024-01-25 PROCEDURE — 80053 COMPREHEN METABOLIC PANEL: CPT

## 2024-01-29 ENCOUNTER — HOSPITAL ENCOUNTER (OUTPATIENT)
Age: 89
Setting detail: SPECIMEN
Discharge: HOME | End: 2024-01-29
Payer: MEDICARE

## 2024-01-29 LAB
ANION GAP SERPL CALCULATED.3IONS-SCNC: 8 MMOL/L (ref 7–16)
BUN SERPL-MCNC: 20 MG/DL (ref 6–23)
CALCIUM SERPL-MCNC: 8.3 MG/DL (ref 8.3–10.6)
CHLORIDE BLD-SCNC: 112 MMOL/L (ref 99–110)
CO2: 27 MMOL/L (ref 21–32)
CREAT SERPL-MCNC: 0.8 MG/DL (ref 0.6–1.1)
GFR SERPL CREATININE-BSD FRML MDRD: >60 ML/MIN/1.73M2
GLUCOSE SERPL-MCNC: 88 MG/DL (ref 70–99)
POTASSIUM SERPL-SCNC: 4 MMOL/L (ref 3.5–5.1)
SODIUM BLD-SCNC: 147 MMOL/L (ref 135–145)

## 2024-01-29 PROCEDURE — 80048 BASIC METABOLIC PNL TOTAL CA: CPT

## 2024-01-29 PROCEDURE — 36415 COLL VENOUS BLD VENIPUNCTURE: CPT

## 2024-02-11 ENCOUNTER — HOSPITAL ENCOUNTER (OUTPATIENT)
Age: 89
Setting detail: SPECIMEN
Discharge: HOME | End: 2024-02-11
Payer: MEDICARE

## 2024-02-11 LAB
ALBUMIN SERPL-MCNC: 3.6 GM/DL (ref 3.4–5)
ALP BLD-CCNC: 108 IU/L (ref 40–128)
ALT SERPL-CCNC: 25 U/L (ref 10–40)
ANION GAP SERPL CALCULATED.3IONS-SCNC: ABNORMAL MMOL/L (ref 7–16)
AST SERPL-CCNC: 24 IU/L (ref 15–37)
BANDED NEUTROPHILS ABSOLUTE COUNT: 0.55 K/CU MM
BANDED NEUTROPHILS RELATIVE PERCENT: 2 % (ref 5–11)
BILIRUB SERPL-MCNC: 0.8 MG/DL (ref 0–1)
BUN SERPL-MCNC: 36 MG/DL (ref 6–23)
CALCIUM SERPL-MCNC: 9.3 MG/DL (ref 8.3–10.6)
CHLORIDE BLD-SCNC: 134 MMOL/L (ref 99–110)
CO2: 25 MMOL/L (ref 21–32)
CREAT SERPL-MCNC: 1.2 MG/DL (ref 0.6–1.1)
DIFFERENTIAL TYPE: ABNORMAL
GFR SERPL CREATININE-BSD FRML MDRD: 43 ML/MIN/1.73M2
GLUCOSE SERPL-MCNC: 169 MG/DL (ref 70–99)
HCT VFR BLD CALC: 44.6 % (ref 37–47)
HEMOGLOBIN: 13.2 GM/DL (ref 12.5–16)
LYMPHOCYTES ABSOLUTE: 2.2 K/CU MM
LYMPHOCYTES RELATIVE PERCENT: 8 % (ref 24–44)
MCH RBC QN AUTO: 27.8 PG (ref 27–31)
MCHC RBC AUTO-ENTMCNC: 29.6 % (ref 32–36)
MCV RBC AUTO: 94.1 FL (ref 78–100)
MONOCYTES ABSOLUTE: 0.8 K/CU MM
MONOCYTES RELATIVE PERCENT: 3 % (ref 0–4)
PDW BLD-RTO: 15.8 % (ref 11.7–14.9)
PLATELET # BLD: 254 K/CU MM (ref 140–440)
PLT MORPHOLOGY: ABNORMAL
PMV BLD AUTO: 10.8 FL (ref 7.5–11.1)
POTASSIUM SERPL-SCNC: 3.6 MMOL/L (ref 3.5–5.1)
RBC # BLD: 4.74 M/CU MM (ref 4.2–5.4)
RBC # BLD: ABNORMAL 10*6/UL
SEGMENTED NEUTROPHILS ABSOLUTE COUNT: 24.1 K/CU MM
SEGMENTED NEUTROPHILS RELATIVE PERCENT: 87 % (ref 36–66)
SODIUM BLD-SCNC: 146 MMOL/L (ref 135–145)
TOTAL PROTEIN: 6.1 GM/DL (ref 6.4–8.2)
WBC # BLD: 27.6 K/CU MM (ref 4–10.5)

## 2024-02-11 PROCEDURE — 87086 URINE CULTURE/COLONY COUNT: CPT

## 2024-02-11 PROCEDURE — 85007 BL SMEAR W/DIFF WBC COUNT: CPT

## 2024-02-11 PROCEDURE — 80053 COMPREHEN METABOLIC PANEL: CPT

## 2024-02-11 PROCEDURE — 36415 COLL VENOUS BLD VENIPUNCTURE: CPT

## 2024-02-11 PROCEDURE — 85027 COMPLETE CBC AUTOMATED: CPT

## 2024-02-11 PROCEDURE — 81001 URINALYSIS AUTO W/SCOPE: CPT

## 2024-02-12 LAB
BACTERIA: NEGATIVE /HPF
BILIRUBIN URINE: NEGATIVE MG/DL
CLARITY: CLEAR
COLOR: YELLOW
GLUCOSE, URINE: NEGATIVE MG/DL
KETONES, URINE: NEGATIVE MG/DL
LEUKOCYTE ESTERASE, URINE: ABNORMAL
MUCUS: ABNORMAL HPF
NITRITE URINE, QUANTITATIVE: POSITIVE
PH, URINE: 6.5 (ref 5–8)
PROTEIN UA: 30 MG/DL
RBC URINE: 4 /HPF (ref 0–6)
SPECIFIC GRAVITY UA: 1.02 (ref 1–1.03)
SQUAMOUS EPITHELIAL: 1 /HPF
TRICHOMONAS: ABNORMAL /HPF
UROBILINOGEN, URINE: 0.2 MG/DL (ref 0.2–1)
WBC UA: 178 /HPF (ref 0–5)

## 2024-02-13 LAB
CULTURE: NORMAL
Lab: NORMAL
SPECIMEN: NORMAL

## 2024-02-15 ENCOUNTER — HOSPITAL ENCOUNTER (OUTPATIENT)
Age: 89
Setting detail: SPECIMEN
Discharge: HOME | End: 2024-02-15
Payer: MEDICARE

## 2024-02-15 LAB
ANION GAP SERPL CALCULATED.3IONS-SCNC: 10 MMOL/L (ref 7–16)
BUN SERPL-MCNC: 29 MG/DL (ref 6–23)
CALCIUM SERPL-MCNC: 8.7 MG/DL (ref 8.3–10.6)
CHLORIDE BLD-SCNC: 131 MMOL/L (ref 99–110)
CO2: 24 MMOL/L (ref 21–32)
CREAT SERPL-MCNC: 0.8 MG/DL (ref 0.6–1.1)
GFR SERPL CREATININE-BSD FRML MDRD: >60 ML/MIN/1.73M2
GLUCOSE SERPL-MCNC: 113 MG/DL (ref 70–99)
HCT VFR BLD CALC: 45.8 % (ref 37–47)
HEMOGLOBIN: 12.4 GM/DL (ref 12.5–16)
MCH RBC QN AUTO: 27.6 PG (ref 27–31)
MCHC RBC AUTO-ENTMCNC: 27.1 % (ref 32–36)
MCV RBC AUTO: 102 FL (ref 78–100)
PDW BLD-RTO: 16.4 % (ref 11.7–14.9)
PLATELET # BLD: 193 K/CU MM (ref 140–440)
PMV BLD AUTO: 12.4 FL (ref 7.5–11.1)
POTASSIUM SERPL-SCNC: 4.4 MMOL/L (ref 3.5–5.1)
RBC # BLD: 4.49 M/CU MM (ref 4.2–5.4)
SODIUM BLD-SCNC: 165 MMOL/L (ref 135–145)
WBC # BLD: 8.3 K/CU MM (ref 4–10.5)

## 2024-02-15 PROCEDURE — 80048 BASIC METABOLIC PNL TOTAL CA: CPT

## 2024-02-15 PROCEDURE — 36415 COLL VENOUS BLD VENIPUNCTURE: CPT

## 2024-02-15 PROCEDURE — 85027 COMPLETE CBC AUTOMATED: CPT
